# Patient Record
Sex: MALE | Race: WHITE | NOT HISPANIC OR LATINO | Employment: FULL TIME | URBAN - METROPOLITAN AREA
[De-identification: names, ages, dates, MRNs, and addresses within clinical notes are randomized per-mention and may not be internally consistent; named-entity substitution may affect disease eponyms.]

---

## 2017-04-15 ENCOUNTER — TELEPHONE (OUTPATIENT)
Dept: URGENT CARE | Facility: CLINIC | Age: 22
End: 2017-04-15

## 2017-04-15 ENCOUNTER — OFFICE VISIT (OUTPATIENT)
Dept: URGENT CARE | Facility: CLINIC | Age: 22
End: 2017-04-15
Payer: COMMERCIAL

## 2017-04-15 VITALS
TEMPERATURE: 98.2 F | OXYGEN SATURATION: 100 % | SYSTOLIC BLOOD PRESSURE: 138 MMHG | HEIGHT: 75 IN | WEIGHT: 203 LBS | RESPIRATION RATE: 18 BRPM | DIASTOLIC BLOOD PRESSURE: 84 MMHG | HEART RATE: 91 BPM | BODY MASS INDEX: 25.24 KG/M2

## 2017-04-15 DIAGNOSIS — S09.92XA NASAL TRAUMA, INITIAL ENCOUNTER: ICD-10-CM

## 2017-04-15 PROCEDURE — 99214 OFFICE O/P EST MOD 30 MIN: CPT | Performed by: NURSE PRACTITIONER

## 2017-04-15 RX ORDER — NAPROXEN 500 MG/1
500 TABLET ORAL 2 TIMES DAILY WITH MEALS
Qty: 10 TAB | Refills: 0 | Status: SHIPPED | OUTPATIENT
Start: 2017-04-15 | End: 2017-04-20

## 2017-04-15 ASSESSMENT — ENCOUNTER SYMPTOMS
HEADACHES: 0
DOUBLE VISION: 0
WEAKNESS: 0
DIAPHORESIS: 0
BLURRED VISION: 0
CHILLS: 0
DIZZINESS: 0
PHOTOPHOBIA: 0
FEVER: 0
EYE PAIN: 0

## 2017-04-15 NOTE — TELEPHONE ENCOUNTER
PC from patient.  He has tried OTC NSAIDs today since office visit with minimal pain relief.  Will prescribe Naprosyn prn pain.  May also take OTC tylenol in conjunction with this medication.  Continue ice compress and head elevation prn comfort measures. Follow up as discussed at visit today.

## 2017-04-15 NOTE — MR AVS SNAPSHOT
"        Nabeel Montanad   4/15/2017 10:00 AM   Office Visit   MRN: [id number]    Department:  [facility]   Dept Phone:  [phone]    Description:  Male : 1995   Provider:  CJ Singh           Reason for Visit     Facial Injury was hit by soft ball on nose yesterday      Allergies as of 4/15/2017     Allergen Noted Reactions    Amoxicillin 2015   Hives, Rash    Per patient      You were diagnosed with     Nasal trauma, initial encounter   [[id number]]         Vital Signs     Blood Pressure Pulse Temperature Respirations Height Weight    138/84 mmHg 91 36.8 °C (98.2 °F) 18 1.905 m (6' 3\") 92.08 kg (203 lb)    Body Mass Index Oxygen Saturation Smoking Status             25.37 kg/m2 100% Never Smoker          Basic Information     Date Of Birth Sex Race Ethnicity Preferred Language    1995 Male White Non- English      Problem List              ICD-10-CM Priority Class Noted - Resolved    IBS (irritable bowel syndrome) K58.9 Medium  2014 - Present    Anxiety F41.9 Medium  2014 - Present    Skin lesion of chest wall L98.9 Medium Present on Arrival 2014 - Present    Leukopenia D72.819   2015 - Present    Painful skin lesion L98.9   2015 - Present    HIV (human immunodeficiency virus infection) (CMS-[facility]) Z21   1/3/2016 - Present      Health Maintenance        Date Due Completion Dates    IMM PNEUMOCOCCAL 19-64 (ADULT) HIGHEST RISK SERIES (1 of 3 - PCV13) 3/27/2014 ---    IMM DTaP/Tdap/Td Vaccine (6 - Td) 2018, 1996, 1995, 1995, 1995            Current Immunizations     Dtap Vaccine 1996, 1995, 1995, 1995    HPV 9-VALENT VACCINE (GARDASIL 9) 12/10/2015    HPV Quadrivalent Vaccine (GARDASIL) 2015    Hepatitis A Vaccine, Ped/Adol 1998, 1998, 1998    Hepatitis B Vaccine Non-Recombivax (Ped/Adol) 1995, 1995, 1995    Hib Vaccine (Prp-d) Historical Data 1996, 1995, " 1995    IPV 4/24/1996, 1995, 1995, 1995    Meningococcal Conjugate Vaccine MCV4 (Menactra) 8/17/2015    OPV - Historical Data 11/15/2001    Tdap Vaccine 8/12/2008    Tetanus Vaccine 2/25/2003    Varicella Vaccine Live 8/12/2008, 6/2/2004      Below and/or attached are the medications your provider expects you to take. Review all of your home medications and newly ordered medications with your provider and/or pharmacist. Follow medication instructions as directed by your provider and/or pharmacist. Please keep your medication list with you and share with your provider. Update the information when medications are discontinued, doses are changed, or new medications (including over-the-counter products) are added; and carry medication information at all times in the event of emergency situations     Allergies:  AMOXICILLIN - Hives,Rash               Medications  Valid as of: April 15, 2017 - 10:55 AM    Generic Name Brand Name Tablet Size Instructions for use    Abacavir-Dolutegravir-Lamivud (Tab) Abacavir-Dolutegravir-Lamivud 600- MG Take  by mouth every day.        Abacavir-Dolutegravir-Lamivud   Take  by mouth.        Azithromycin (Tab) ZITHROMAX 250 MG Use as package directs.        Azithromycin (Tab) ZITHROMAX 250 MG Take as directed on package. 1 pack        Guaifenesin-Codeine (Solution) ROBITUSSIN -10 mg/5mL Take 5 mL by mouth at bedtime as needed for Cough.        Levonorg-Eth Estrad Triphasic (Tab) ENPRESSE  Take 1 Tab by mouth every day.        Oseltamivir Phosphate (Cap) TAMIFLU 75 MG Take 1 Cap by mouth 2 times a day.        .                 Medicines prescribed today were sent to:     [id number] [place] [facility] [place] - [address]    [address] [place] [address]    Phone: [phone] Fax: [phone]    Open 24 Hours?: No      Medication refill instructions:       If your prescription bottle indicates you have medication refills left, it is not necessary  to call your provider’s office. Please contact your pharmacy and they will refill your medication.    If your prescription bottle indicates you do not have any refills left, you may request refills at any time through one of the following ways: The online [facility] system (except Urgent Care), by calling your provider’s office, or by asking your pharmacy to contact your provider’s office with a refill request. Medication refills are processed only during regular business hours and may not be available until the next business day. Your provider may request additional information or to have a follow-up visit with you prior to refilling your medication.   *Please Note: Medication refills are assigned a new Rx number when refilled electronically. Your pharmacy may indicate that no refills were authorized even though a new prescription for the same medication is available at the pharmacy. Please request the medicine by name with the pharmacy before contacting your provider for a refill.           [facility] Access Code: Activation code not generated  Current [facility] Status: Active

## 2017-04-15 NOTE — PROGRESS NOTES
"Subjective:      Nabeel Ladd is a 22 y.o. male who presents with Facial Injury            Facial Injury  Pertinent negatives include no chills, congestion, diaphoresis, fever, headaches or weakness.   Patient comes in today after being struck in the nose yesterday with a softball.  He was attempting to catch a fly ball and missed, with the ball falling directly in his nose.  He denies any epistaxis at time of injury.  He denies any LOC.  He reports pain and swelling over the bridge of the nose, primarily on the left side, with no pain of other areas of the face.  No eye pain or vision changes.  No nasal drainage.  He is not taking any medications or using home measures to treat the symptoms.  He has a history of 3 prior \"broken noses\", one of which was confirmed on x-ray.  He has never required further medical care or surgical intervention for the past injuries.      Review of Systems   Constitutional: Negative for fever, chills, malaise/fatigue and diaphoresis.   HENT: Negative for congestion, ear pain and nosebleeds.    Eyes: Negative for blurred vision, double vision, photophobia and pain.   Neurological: Negative for dizziness, weakness and headaches.     Medications, Allergies, and current problem list reviewed today in Epic     Objective:     /84 mmHg  Pulse 91  Temp(Src) 36.8 °C (98.2 °F)  Resp 18  Ht 1.905 m (6' 3\")  Wt 92.08 kg (203 lb)  BMI 25.37 kg/m2  SpO2 100%     Physical Exam   Constitutional: He is oriented to person, place, and time.   HENT:   Head: Normocephalic.   Nose: Sinus tenderness present. No mucosal edema, rhinorrhea, nasal deformity, septal deviation or nasal septal hematoma. No epistaxis.  No foreign bodies. Right sinus exhibits no maxillary sinus tenderness and no frontal sinus tenderness. Left sinus exhibits no maxillary sinus tenderness and no frontal sinus tenderness.       Mouth/Throat: Oropharynx is clear and moist. No oropharyngeal exudate.   Nasal bridge is slightly " swollen and tender to palpation, particularly on the left.  No bruising.  No other facial pain on palpation.   Eyes: Conjunctivae and EOM are normal. Pupils are equal, round, and reactive to light. Right eye exhibits no discharge. Left eye exhibits no discharge. No scleral icterus.   Neck: Neck supple. No JVD present. No tracheal deviation present. No thyromegaly present.   Cardiovascular: Normal rate, regular rhythm and normal heart sounds.    Pulmonary/Chest: Effort normal and breath sounds normal. No stridor.   Lymphadenopathy:     He has no cervical adenopathy.   Neurological: He is alert and oriented to person, place, and time.   Vitals reviewed.              Assessment/Plan:     1. Nasal trauma, initial encounter    Discussed exam findings with patient.  No indication for facial CT at this time.  Treat for nasal bridge trauma, presumptive of self-limited fracture.  OTC NSAIDs or tylenol prn pain.  Ice compress and elevation prn comfort measures.  Maintain adequate po hydration.  Follow up precautions reviewed.  Patient verbalized understanding of and agreed with plan of care.

## 2018-01-21 ENCOUNTER — HOSPITAL ENCOUNTER (EMERGENCY)
Facility: MEDICAL CENTER | Age: 23
End: 2018-01-21
Attending: EMERGENCY MEDICINE
Payer: COMMERCIAL

## 2018-01-21 VITALS
HEIGHT: 75 IN | RESPIRATION RATE: 18 BRPM | HEART RATE: 97 BPM | OXYGEN SATURATION: 97 % | BODY MASS INDEX: 26.26 KG/M2 | TEMPERATURE: 99.9 F | SYSTOLIC BLOOD PRESSURE: 153 MMHG | DIASTOLIC BLOOD PRESSURE: 91 MMHG | WEIGHT: 211.2 LBS

## 2018-01-21 DIAGNOSIS — N45.1 LEFT EPIDIDYMITIS: ICD-10-CM

## 2018-01-21 LAB
APPEARANCE UR: CLEAR
BACTERIA #/AREA URNS HPF: ABNORMAL /HPF
BILIRUB UR QL STRIP.AUTO: NEGATIVE
COLOR UR: YELLOW
EPI CELLS #/AREA URNS HPF: ABNORMAL /HPF
GLUCOSE UR STRIP.AUTO-MCNC: NEGATIVE MG/DL
KETONES UR STRIP.AUTO-MCNC: NEGATIVE MG/DL
LEUKOCYTE ESTERASE UR QL STRIP.AUTO: NEGATIVE
MICRO URNS: ABNORMAL
MUCOUS THREADS #/AREA URNS HPF: ABNORMAL /HPF
NITRITE UR QL STRIP.AUTO: NEGATIVE
PH UR STRIP.AUTO: 6 [PH]
PROT UR QL STRIP: NEGATIVE MG/DL
RBC # URNS HPF: ABNORMAL /HPF
RBC UR QL AUTO: ABNORMAL
SP GR UR STRIP.AUTO: <=1.005
WBC #/AREA URNS HPF: ABNORMAL /HPF

## 2018-01-21 PROCEDURE — 99284 EMERGENCY DEPT VISIT MOD MDM: CPT

## 2018-01-21 PROCEDURE — 96372 THER/PROPH/DIAG INJ SC/IM: CPT

## 2018-01-21 PROCEDURE — 87591 N.GONORRHOEAE DNA AMP PROB: CPT

## 2018-01-21 PROCEDURE — 81001 URINALYSIS AUTO W/SCOPE: CPT

## 2018-01-21 PROCEDURE — 700102 HCHG RX REV CODE 250 W/ 637 OVERRIDE(OP): Performed by: EMERGENCY MEDICINE

## 2018-01-21 PROCEDURE — 700111 HCHG RX REV CODE 636 W/ 250 OVERRIDE (IP): Performed by: EMERGENCY MEDICINE

## 2018-01-21 PROCEDURE — 87491 CHLMYD TRACH DNA AMP PROBE: CPT

## 2018-01-21 PROCEDURE — A9270 NON-COVERED ITEM OR SERVICE: HCPCS | Performed by: EMERGENCY MEDICINE

## 2018-01-21 RX ORDER — CIPROFLOXACIN 500 MG/1
500 TABLET, FILM COATED ORAL 2 TIMES DAILY
Qty: 14 TAB | Refills: 0 | Status: SHIPPED | OUTPATIENT
Start: 2018-01-21 | End: 2018-01-28

## 2018-01-21 RX ORDER — IBUPROFEN 600 MG/1
600 TABLET ORAL EVERY 6 HOURS PRN
Qty: 30 TAB | Refills: 0 | Status: SHIPPED | OUTPATIENT
Start: 2018-01-21 | End: 2018-01-21

## 2018-01-21 RX ORDER — CEFTRIAXONE 500 MG/1
250 INJECTION, POWDER, FOR SOLUTION INTRAMUSCULAR; INTRAVENOUS ONCE
Status: COMPLETED | OUTPATIENT
Start: 2018-01-21 | End: 2018-01-21

## 2018-01-21 RX ORDER — IBUPROFEN 600 MG/1
600 TABLET ORAL ONCE
Status: COMPLETED | OUTPATIENT
Start: 2018-01-21 | End: 2018-01-21

## 2018-01-21 RX ORDER — CIPROFLOXACIN 500 MG/1
500 TABLET, FILM COATED ORAL 2 TIMES DAILY
Qty: 14 TAB | Refills: 0 | Status: SHIPPED | OUTPATIENT
Start: 2018-01-21 | End: 2018-01-21

## 2018-01-21 RX ORDER — AZITHROMYCIN 250 MG/1
1000 TABLET, FILM COATED ORAL ONCE
Status: COMPLETED | OUTPATIENT
Start: 2018-01-21 | End: 2018-01-21

## 2018-01-21 RX ORDER — CIPROFLOXACIN 500 MG/1
500 TABLET, FILM COATED ORAL ONCE
Status: COMPLETED | OUTPATIENT
Start: 2018-01-21 | End: 2018-01-21

## 2018-01-21 RX ORDER — IBUPROFEN 600 MG/1
600 TABLET ORAL EVERY 6 HOURS PRN
Qty: 30 TAB | Refills: 0 | Status: SHIPPED | OUTPATIENT
Start: 2018-01-21 | End: 2018-08-21

## 2018-01-21 RX ADMIN — CIPROFLOXACIN 500 MG: 500 TABLET, FILM COATED ORAL at 19:47

## 2018-01-21 RX ADMIN — CEFTRIAXONE SODIUM 250 MG: 500 INJECTION, POWDER, FOR SOLUTION INTRAMUSCULAR; INTRAVENOUS at 19:45

## 2018-01-21 RX ADMIN — AZITHROMYCIN 1000 MG: 250 TABLET, FILM COATED ORAL at 19:46

## 2018-01-21 RX ADMIN — IBUPROFEN 600 MG: 600 TABLET, FILM COATED ORAL at 19:46

## 2018-01-21 ASSESSMENT — PAIN SCALES - GENERAL
PAINLEVEL_OUTOF10: 7
PAINLEVEL_OUTOF10: 8

## 2018-01-22 NOTE — PROGRESS NOTES
Patient has allergy to penicillin. Will hold patient for 15 minutes and monitor for reaction to Rocephin. MD notified

## 2018-01-22 NOTE — ED PROVIDER NOTES
"ED Provider Note    CHIEF COMPLAINT  Chief Complaint   Patient presents with   • Scrotal Pain     left scrotal pain started today     Seen at 7:11 PM    HPI  Nabeel Ladd is a 22 y.o. male who presents with gradual onset of left scrotal pain. The patient 1st noticed it when he woke from sleep early this morning. He notes the pain is intermittent and only present when he walks or has direct palpation to the left scrotum. He describes it as stabbing, nonradiating. He did have an episode of stabbing right flank pain, this was several days ago and resolved spontaneously. He notes that he had some blood in his ejaculate as well as a urine today.    He does have penetrative anal intercourse and has had a new partner in the last week. He denies any fevers, chills, chest pain, shortness of breath, nausea, vomiting, dysuria, rash. He denies any scrotal trauma.    REVIEW OF SYSTEMS  See HPI  PAST MEDICAL HISTORY   has a past medical history of Anxiety; IBS (irritable bowel syndrome); and IBS (irritable bowel syndrome).    SOCIAL HISTORY  Social History     Social History Main Topics   • Smoking status: Never Smoker   • Smokeless tobacco: Never Used   • Alcohol use 0.0 oz/week   • Drug use:      Types: Marijuana      Comment: ocassional marijuana/no cigarettes   • Sexual activity: Yes     Partners: Male      Comment: uses condoms       SURGICAL HISTORY   has a past surgical history that includes other.    CURRENT MEDICATIONS  Reviewed.  See Encounter Summary.     ALLERGIES  Allergies   Allergen Reactions   • Amoxicillin Hives and Rash     Per patient       PHYSICAL EXAM  VITAL SIGNS: /91   Pulse 97   Temp 37.7 °C (99.9 °F)   Resp 18   Ht 1.905 m (6' 3\")   Wt 95.8 kg (211 lb 3.2 oz)   SpO2 97%   BMI 26.40 kg/m²   Constitutional: Awake, alert in no apparent distress.  HENT: Normocephalic, Bilateral external ears normal. Nose normal.   Eyes: Conjunctiva normal, non-icteric, EOMI.    Thorax & Lungs: Easy unlabored " respirations, clear to auscultation bilaterally  Cardiovascular:  Tachycardic, regular  Abdomen:  No distention, soft, nontender, normal active bowel sounds  : Circumcised, normal phallus, no meatal discharge. No inguinal masses or tenderness. Right testicle is normal. Left testicle has some mild edema in the overlying skin, there is no warmth or redness. The testicle was nontender within normal lives. The epididymis on the posterior aspect of the testicle is significantly tender.    Skin: Visualized skin is  Dry, No erythema, No rash.   Extremities:   atraumatic   Neurologic: Alert, Grossly non-focal.   Psychiatric: Affect and Mood normal    RADIOLOGY  No orders to display     The radiologist's interpretation of all radiological studies have been reviewed by me.  Nursing notes and vital signs were reviewed. (See chart for details)    Decision Making:  This is a 22 y.o. year old male who presents with left scrotal pain and bloody ejaculate. Examination and history are classic for epididymitis. The patient is at risk for E. coli epididymitis as well as gonorrhea and chlamydia. He will be treated for all 3. GC urine has been sent. Urinalysis shows rare bacteria.    His presentation is not concerning for testicular torsion.     I explained the pathophysiology of epididymitis the patient. I explained that his symptoms should resolve with antibiotics, scrotal support and anti-inflammatories. He should return for any intractable abdominal pain, intractable testicular pain or any other concern. Of note, the patient is tachycardic of unclear etiology. He denies any chest pain, shortness of breath, nausea, vomiting or fevers. He is moderately hypertensive as well. I recommend follow-up for this. I do not suspect sepsis at this time.    The patient's blood pressure is elevated today. >120/80. I have referred them to primary care for follow up.       DISPOSITION:  Patient will be discharged home in good  condition.    Discharge Medications:  Discharge Medication List as of 1/21/2018  7:57 PM      START taking these medications    Details   ciprofloxacin (CIPRO) 500 MG Tab Take 1 Tab by mouth 2 times a day for 7 days., Disp-14 Tab, R-0, Normal      ibuprofen (MOTRIN) 600 MG Tab Take 1 Tab by mouth every 6 hours as needed., Disp-30 Tab, R-0, Normal             The patient was discharged home (see d/c instructions) and told to return immediately for any signs or symptoms listed, or any worsening at all.  The patient verbally agreed to the discharge precautions and follow-up plan which is documented in EPIC.    FINAL IMPRESSION  1. Left epididymitis

## 2018-01-23 LAB
C TRACH DNA SPEC QL NAA+PROBE: NEGATIVE
N GONORRHOEA DNA SPEC QL NAA+PROBE: NEGATIVE
SPECIMEN SOURCE: NORMAL

## 2018-08-21 ENCOUNTER — OFFICE VISIT (OUTPATIENT)
Dept: URGENT CARE | Facility: CLINIC | Age: 23
End: 2018-08-21
Payer: COMMERCIAL

## 2018-08-21 VITALS
OXYGEN SATURATION: 98 % | TEMPERATURE: 98.1 F | SYSTOLIC BLOOD PRESSURE: 110 MMHG | HEART RATE: 90 BPM | WEIGHT: 211 LBS | RESPIRATION RATE: 16 BRPM | HEIGHT: 75 IN | DIASTOLIC BLOOD PRESSURE: 74 MMHG | BODY MASS INDEX: 26.24 KG/M2

## 2018-08-21 DIAGNOSIS — T14.8XXA WOUND OF SKIN: ICD-10-CM

## 2018-08-21 PROCEDURE — 90471 IMMUNIZATION ADMIN: CPT | Performed by: FAMILY MEDICINE

## 2018-08-21 PROCEDURE — 99214 OFFICE O/P EST MOD 30 MIN: CPT | Mod: 25 | Performed by: FAMILY MEDICINE

## 2018-08-21 PROCEDURE — 90715 TDAP VACCINE 7 YRS/> IM: CPT | Performed by: FAMILY MEDICINE

## 2018-08-21 RX ORDER — CIPROFLOXACIN 500 MG/1
500 TABLET, FILM COATED ORAL 2 TIMES DAILY
Qty: 6 TAB | Refills: 0 | Status: SHIPPED | OUTPATIENT
Start: 2018-08-21 | End: 2018-08-24

## 2018-08-21 ASSESSMENT — ENCOUNTER SYMPTOMS: FEVER: 0

## 2018-08-21 NOTE — PROGRESS NOTES
"Subjective:      Nabeel Ladd is a 23 y.o. male who presents with Puncture Wound (in right heel yesterday pulled metal out, concerned about infection and td)    Chief Complaint   Patient presents with   • Puncture Wound     in right heel yesterday pulled metal out, concerned about infection and td        - This is a very pleasant 23 y.o. male with complaints of stepped on metal object yesterday. Unsure of last td booster. Mild pain to PW site            ALLERGIES:  Amoxicillin     PMH:  Past Medical History:   Diagnosis Date   • Anxiety    • IBS (irritable bowel syndrome)    • IBS (irritable bowel syndrome)         MEDS:    Current Outpatient Prescriptions:   •  ciprofloxacin (CIPRO) 500 MG Tab, Take 1 Tab by mouth 2 times a day for 3 days., Disp: 6 Tab, Rfl: 0  •  Abacavir-Dolutegravir-Lamivud (TRIUMEQ PO), Take  by mouth., Disp: , Rfl:   •  levonorgestrel-ethinyl estradiol (ENPRESSE) per tablet, Take 1 Tab by mouth every day., Disp: , Rfl:     ** I have documented what I find to be significant in regards to past medical, social, family and surgical history  in my HPI or under PMH/PSH/FH review section, otherwise it is contributory **         HPI    Review of Systems   Constitutional: Negative for fever.   All other systems reviewed and are negative.         Objective:     /74   Pulse 90   Temp 36.7 °C (98.1 °F)   Resp 16   Ht 1.905 m (6' 3\")   Wt 95.7 kg (211 lb)   SpO2 98%   BMI 26.37 kg/m²      Physical Exam   Constitutional: He appears well-developed. No distress.   HENT:   Head: Normocephalic and atraumatic.   Pulmonary/Chest: Effort normal. No respiratory distress.   Neurological: He is alert. He exhibits normal muscle tone.   Skin: Skin is warm and dry.   Psychiatric: He has a normal mood and affect. Judgment normal.   Nursing note and vitals reviewed.  PW plantar aspect Rt heel w/o complication             Assessment/Plan:           1. Wound of skin  TD =>6yo IM    ciprofloxacin (CIPRO) 500 MG " Tab             Dx & d/c instructions discussed w/ patient and/or family members. Follow up w/ Prvt Dr or here in 3-4 days if not getting better, sooner if needed,  ER if worse and UC/PCP unavailable.        Possible side effects (i.e. Rash, GI upset/constipation, sedation, elevation of BP or sugars) of any medications given discussed.

## 2019-05-01 ENCOUNTER — OFFICE VISIT (OUTPATIENT)
Dept: URGENT CARE | Facility: MEDICAL CENTER | Age: 24
End: 2019-05-01
Payer: COMMERCIAL

## 2019-05-01 VITALS
BODY MASS INDEX: 26.24 KG/M2 | HEART RATE: 82 BPM | HEIGHT: 75 IN | OXYGEN SATURATION: 97 % | TEMPERATURE: 98.5 F | DIASTOLIC BLOOD PRESSURE: 72 MMHG | SYSTOLIC BLOOD PRESSURE: 118 MMHG | WEIGHT: 211 LBS | RESPIRATION RATE: 16 BRPM

## 2019-05-01 DIAGNOSIS — B20 HIV (HUMAN IMMUNODEFICIENCY VIRUS INFECTION) (HCC): ICD-10-CM

## 2019-05-01 DIAGNOSIS — B34.9 ACUTE VIRAL SYNDROME: ICD-10-CM

## 2019-05-01 LAB
FLUAV+FLUBV AG SPEC QL IA: NEGATIVE
INT CON NEG: NEGATIVE
INT CON NEG: NEGATIVE
INT CON POS: POSITIVE
INT CON POS: POSITIVE
S PYO AG THROAT QL: NEGATIVE

## 2019-05-01 PROCEDURE — 99214 OFFICE O/P EST MOD 30 MIN: CPT | Performed by: PHYSICIAN ASSISTANT

## 2019-05-01 PROCEDURE — 87804 INFLUENZA ASSAY W/OPTIC: CPT | Performed by: PHYSICIAN ASSISTANT

## 2019-05-01 PROCEDURE — 87880 STREP A ASSAY W/OPTIC: CPT | Performed by: PHYSICIAN ASSISTANT

## 2019-05-01 RX ORDER — BENZONATATE 100 MG/1
200 CAPSULE ORAL 3 TIMES DAILY PRN
Qty: 60 CAP | Refills: 0 | Status: SHIPPED | OUTPATIENT
Start: 2019-05-01 | End: 2019-07-03

## 2019-05-01 ASSESSMENT — ENCOUNTER SYMPTOMS
SORE THROAT: 1
SHORTNESS OF BREATH: 0
HEMOPTYSIS: 0
FEVER: 0
CHILLS: 0
COUGH: 1
HEADACHES: 1
WHEEZING: 0
SPUTUM PRODUCTION: 1

## 2019-05-01 NOTE — PROGRESS NOTES
"Subjective:   Nabeel Ladd is a 24 y.o. male who presents for Influenza (started with sore throat, turned into chills, vomiting, hacking mucus, chest tightness, cough)    This is a new problem.  Patient presents to urgent care with concern for possible influenza.  He reports onset of sore throat 2 days ago with nasal congestion and cough beginning yesterday.  He reports generalized body aches and fatigue as well.  Cough is productive of colored sputum that is thick at times.  Patient also reports pain in the upper anterior chest with deep breathing and coughing.  He denies any shortness of breath or hemoptysis..  Patient has had no known illness exposure.  Patient denies fever.    Of note, the patient is HIV positive being treated with antiviral and his most recent viral load was undetectable.        Influenza   Associated symptoms include congestion, coughing, headaches and a sore throat. Pertinent negatives include no chills, fever or rash.     Review of Systems   Constitutional: Positive for malaise/fatigue. Negative for chills and fever.   HENT: Positive for congestion and sore throat. Negative for ear pain.    Respiratory: Positive for cough and sputum production. Negative for hemoptysis, shortness of breath and wheezing.    Cardiovascular: Negative for leg swelling.        Anterior chest wall pain   Skin: Negative for rash.   Neurological: Positive for headaches.   All other systems reviewed and are negative.    Allergies   Allergen Reactions   • Amoxicillin Hives and Rash     Per patient        Objective:   /72   Pulse 82   Temp 36.9 °C (98.5 °F)   Resp 16   Ht 1.905 m (6' 3\")   Wt 95.7 kg (211 lb)   SpO2 97%   BMI 26.37 kg/m²   Physical Exam   Constitutional: He is oriented to person, place, and time. He appears well-developed and well-nourished. He does not appear ill. No distress.   HENT:   Head: Normocephalic and atraumatic.   Right Ear: Tympanic membrane, external ear and ear canal normal. "   Left Ear: Tympanic membrane, external ear and ear canal normal.   Nose: Mucosal edema and rhinorrhea present. Right sinus exhibits no maxillary sinus tenderness and no frontal sinus tenderness. Left sinus exhibits no maxillary sinus tenderness and no frontal sinus tenderness.   Mouth/Throat: Uvula is midline and mucous membranes are normal. Posterior oropharyngeal erythema present. No oropharyngeal exudate. Tonsils are 1+ on the right. Tonsils are 1+ on the left. No tonsillar exudate.   Eyes: Pupils are equal, round, and reactive to light. Conjunctivae and EOM are normal.   Neck: Normal range of motion. Neck supple.   Cardiovascular: Normal rate, regular rhythm and normal heart sounds.  Exam reveals no friction rub.    No murmur heard.  Pulmonary/Chest: Effort normal and breath sounds normal. No respiratory distress.   Abdominal: Soft. Bowel sounds are normal. There is no hepatosplenomegaly. There is no tenderness.   Musculoskeletal: Normal range of motion.   Lymphadenopathy:        Head (right side): No submental, no submandibular and no tonsillar adenopathy present.        Head (left side): No submental, no submandibular and no tonsillar adenopathy present.     He has no cervical adenopathy.        Right: No supraclavicular adenopathy present.        Left: No supraclavicular adenopathy present.   Neurological: He is alert and oriented to person, place, and time. He has normal strength. No cranial nerve deficit or sensory deficit. Coordination normal.   Skin: Skin is warm and dry. No rash noted.   Psychiatric: He has a normal mood and affect. Judgment normal.   Vitals reviewed.          Assessment/Plan:   Assessment    1. Acute viral syndrome  - POCT Influenza A/B  - POCT Rapid Strep A  - maalox plus-benadryl-visc lidocaine (MAGIC MOUTHWASH); 10 ml swish, gargle and spit every 6 hours as needed for sore throat  Dispense: 100 mL; Refill: 0  - benzonatate (TESSALON) 100 MG Cap; Take 2 Caps by mouth 3 times a day as  needed.  Dispense: 60 Cap; Refill: 0    2. HIV (human immunodeficiency virus infection) ([facility])    Rapid strep and influenza testing was negative.  Symptomatic, supportive care.  Increase fluids, rest.  Patient is given a prescription for Magic mouthwash and Tessalon Perles as needed for cough.Increase fluids, rest.  Patient may use salt water gargles, ice pops, cool fluids.      Given the patient's HIV status we will need to watch him very carefully for worsening.  Red flag warning symptoms with strict ER/follow-up precautions are given.    Differential diagnosis, natural history, supportive care, and indications for immediate follow-up discussed.    If not improving in 3-5 days, F/U with PCP or return to  or sooner if worsens    Please note that this note was created using voice recognition speech to text software. Every effort has been made to correct obvious errors.  However, I expect there are errors of grammar and possibly context that were not discovered prior to finalizing the note    JOYCE Garcia PA-C

## 2019-05-01 NOTE — LETTER
May 1, 2019         Patient: Nabeel Ladd   YOB: 1995   Date of Visit: 5/1/2019           To Whom it May Concern:    Nabeel Ladd was seen in my clinic on 5/1/2019. He may return to work on 5/3/19..    If you have any questions or concerns, please don't hesitate to call.        Sincerely,           Alicia Garcia P.A.-C.  Electronically Signed

## 2019-05-01 NOTE — PATIENT INSTRUCTIONS
"Viral Syndrome  You or your child has Viral Syndrome. It is the most common infection causing \"colds\" and infections in the nose, throat, sinuses, and breathing tubes. Sometimes the infection causes nausea, vomiting, or diarrhea. The germ that causes the infection is a virus. No antibiotic or other medicine will kill it. There are medicines that you can take to make you or your child more comfortable.   HOME CARE INSTRUCTIONS   · Rest in bed until you start to feel better.   · If you have diarrhea or vomiting, eat small amounts of crackers and toast. Soup is helpful.   · Do not give aspirin or medicine that contains aspirin to children.   · Only take over-the-counter or prescription medicines for pain, discomfort, or fever as directed by your caregiver.   SEEK IMMEDIATE MEDICAL CARE IF:   · You or your child has not improved within one week.   · You or your child has pain that is not at least partially relieved by over-the-counter medicine.   · Thick, colored mucus or blood is coughed up.   · Discharge from the nose becomes thick yellow or green.   · Diarrhea or vomiting gets worse.   · There is any major change in your or your child's condition.   · You or your child develops a skin rash, stiff neck, severe headache, or are unable to hold down food or fluid.   · You or your child has an oral temperature above 102° F (38.9° C), not controlled by medicine.   · Your baby is older than 3 months with a rectal temperature of 102° F (38.9° C) or higher.   · Your baby is 3 months old or younger with a rectal temperature of 100.4° F (38° C) or higher.   Document Released: 12/03/2007 Document Revised: 03/11/2013 Document Reviewed: 12/03/2008  [employer]® Patient Information ©2013 [employer].  "

## 2019-05-02 ENCOUNTER — OFFICE VISIT (OUTPATIENT)
Dept: URGENT CARE | Facility: MEDICAL CENTER | Age: 24
End: 2019-05-02
Payer: COMMERCIAL

## 2019-05-02 VITALS
OXYGEN SATURATION: 97 % | HEART RATE: 110 BPM | HEIGHT: 75 IN | BODY MASS INDEX: 26.04 KG/M2 | WEIGHT: 209.4 LBS | SYSTOLIC BLOOD PRESSURE: 118 MMHG | DIASTOLIC BLOOD PRESSURE: 76 MMHG | TEMPERATURE: 99.3 F

## 2019-05-02 DIAGNOSIS — J02.9 VIRAL PHARYNGITIS: ICD-10-CM

## 2019-05-02 DIAGNOSIS — J02.9 SORE THROAT: ICD-10-CM

## 2019-05-02 DIAGNOSIS — B20 HIV (HUMAN IMMUNODEFICIENCY VIRUS INFECTION) (HCC): ICD-10-CM

## 2019-05-02 LAB
INT CON NEG: NORMAL
INT CON POS: NORMAL
S PYO AG THROAT QL: NEGATIVE

## 2019-05-02 PROCEDURE — 87880 STREP A ASSAY W/OPTIC: CPT | Performed by: NURSE PRACTITIONER

## 2019-05-02 PROCEDURE — 99214 OFFICE O/P EST MOD 30 MIN: CPT | Performed by: NURSE PRACTITIONER

## 2019-05-02 RX ORDER — CLINDAMYCIN HYDROCHLORIDE 300 MG/1
300 CAPSULE ORAL 3 TIMES DAILY
Qty: 30 CAP | Refills: 0 | Status: SHIPPED | OUTPATIENT
Start: 2019-05-02 | End: 2019-05-12

## 2019-05-02 ASSESSMENT — ENCOUNTER SYMPTOMS
SORE THROAT: 1
SWOLLEN GLANDS: 1
COUGH: 1

## 2019-05-02 NOTE — PROGRESS NOTES
"Subjective:      Nabeel Ladd is a 24 y.o. male who presents with Pharyngitis (x2 days; swollen lymph nodes; hurts to swallow; fever; body aches )            Pharyngitis    This is a new problem. Episode onset: pt reports he was seen yesterday in the UC and prompted to return if his symptoms started to get worse. He states his ST is worse today than yesterday and his lymph nodes feel more swollen. He is HIV positive but his last viral load was undetectable. The problem has been gradually worsening. Neither side of throat is experiencing more pain than the other. There has been no fever. Associated symptoms include congestion, coughing and swollen glands. He has tried NSAIDs and gargles (he states he is not using the magic mouth rinse as it was too expensive) for the symptoms. The treatment provided no relief.       Review of Systems   HENT: Positive for congestion and sore throat.    Respiratory: Positive for cough.    All other systems reviewed and are negative.    Past Medical History:   Diagnosis Date   • Anxiety    • IBS (irritable bowel syndrome)    • IBS (irritable bowel syndrome)       Past Surgical History:   Procedure Laterality Date   • OTHER      wisdom teeth 8/13      Social History     Social History   • Marital status: Single     Spouse name: N/A   • Number of children: N/A   • Years of education: N/A     Occupational History   • Not on file.     Social History Main Topics   • Smoking status: Never Smoker   • Smokeless tobacco: Never Used   • Alcohol use 0.0 oz/week   • Drug use: Yes     Types: Marijuana      Comment: ocassional marijuana/no cigarettes   • Sexual activity: Yes     Partners: Male      Comment: uses condoms     Other Topics Concern   • Not on file     Social History Narrative   • No narrative on file          Objective:     /76   Pulse (!) 110   Temp 37.4 °C (99.3 °F) (Temporal)   Ht 1.905 m (6' 3\")   Wt 95 kg (209 lb 6.4 oz)   SpO2 97%   BMI 26.17 kg/m²      Physical Exam "   Constitutional: He is oriented to person, place, and time. Vital signs are normal. He appears well-developed and well-nourished.   HENT:   Head: Normocephalic and atraumatic.   Right Ear: Tympanic membrane and external ear normal.   Left Ear: Tympanic membrane and external ear normal.   Nose: Rhinorrhea present.   Mouth/Throat: Posterior oropharyngeal erythema present. No oropharyngeal exudate.   Eyes: Pupils are equal, round, and reactive to light. EOM are normal.   Neck: Normal range of motion.   Cardiovascular: Normal rate and regular rhythm.    Pulmonary/Chest: Effort normal and breath sounds normal.   Musculoskeletal: Normal range of motion.   Lymphadenopathy:        Head (right side): Submandibular adenopathy present.        Head (left side): Submandibular adenopathy present.   Neurological: He is alert and oriented to person, place, and time.   Skin: Skin is warm and dry. Capillary refill takes less than 2 seconds.   Psychiatric: He has a normal mood and affect. His speech is normal and behavior is normal. Thought content normal.   Vitals reviewed.              Assessment/Plan:     1. Sore throat  - POCT Rapid Strep A NEGATIVE    2. Viral pharyngitis  - clindamycin (CLEOCIN) 300 MG Cap; Take 1 Cap by mouth 3 times a day for 10 days.  Dispense: 30 Cap; Refill: 0    3. HIV (human immunodeficiency virus infection) ([facility])    Discussed with patient viral etiology of pharyngitis at this time   Vitals are stable, non toxic appearing  Contingent antibiotic prescription given to patient to fill upon meeting criteria of guidelines discussed.   Warm salt water gargles  Alternate tylenol and ibuprofen for pain  Soft foods and cool liquids  Throat lozenges as directed  Work note provided  Supportive care, differential diagnoses, and indications for immediate follow-up discussed with patient.    Pathogenesis of diagnosis discussed including typical length and natural progression.      Instructed to return to  or [facility]  emergency department if symptoms fail to improve, for any change in condition, further concerns, or new concerning symptoms.  Patient states understanding of the plan of care and discharge instructions.

## 2019-05-02 NOTE — LETTER
May 2, 2019         Patient: Nabeel Ladd   YOB: 1995   Date of Visit: 5/2/2019           To Whom it May Concern:    Nabeel Ladd was seen in my clinic on 5/2/2019. Please excuse him from work 5/2/19-5/3/19.        Sincerely,           KEIRA Weathers.  Electronically Signed

## 2019-07-02 ENCOUNTER — TELEPHONE (OUTPATIENT)
Dept: MEDICAL GROUP | Facility: LAB | Age: 24
End: 2019-07-02

## 2019-07-02 NOTE — TELEPHONE ENCOUNTER
NEW PATIENT VISIT PRE-VISIT PLANNING    1.  [employer] Patient is checked in Patient Demographics? YES    2.  Immunizations were updated in [facility] using WebIZ?: Yes       •  Web Iz Recommendations: HPV and PREVNAR (PCV13)     3.  Is this appointment scheduled as a Hospital Follow-Up? No    4.  Patient is due for the following Health Maintenance Topics:   Health Maintenance Due   Topic Date Due   • IMM PNEUMOCOCCAL 19-64 (ADULT) HIGHEST RISK SERIES (1 of 3 - PCV13) 03/27/2014   • IMM MENINGOCOCCAL VACCINE (MCV4) (2 - Risk 2-dose series) 10/12/2015         5. Orders for overdue Health Maintenance topics pended in Pre-Charting? N\A    6.  Reviewed/Updated the following with patient:   •   Preferred Pharmacy? NO       •   Preferred Lab? NO       •   Preferred Communication? NO       •   Allergies? NO       •   Medications? NO       •   Social History? NO       •   Family History (document living status of immediate family members and if + hx of cancer, diabetes, hypertension, hyperlipidemia, heart attack, stroke) NO    7.  Updated Care Team?       •   DME Company (gait device, O2, CPAP, etc.) NO       •   Other Specialists (eye doctor, derm, GYN, cardiology, endo, etc): NO    8.  Patient was informed to arrive 15 min prior to their   scheduled appointment and bring in their medication bottles.

## 2019-07-03 ENCOUNTER — OFFICE VISIT (OUTPATIENT)
Dept: MEDICAL GROUP | Facility: LAB | Age: 24
End: 2019-07-03
Payer: COMMERCIAL

## 2019-07-03 ENCOUNTER — HOSPITAL ENCOUNTER (OUTPATIENT)
Dept: RADIOLOGY | Facility: MEDICAL CENTER | Age: 24
End: 2019-07-03
Attending: FAMILY MEDICINE
Payer: COMMERCIAL

## 2019-07-03 VITALS
OXYGEN SATURATION: 96 % | TEMPERATURE: 98.6 F | HEIGHT: 75 IN | WEIGHT: 212.6 LBS | DIASTOLIC BLOOD PRESSURE: 72 MMHG | BODY MASS INDEX: 26.43 KG/M2 | SYSTOLIC BLOOD PRESSURE: 120 MMHG | HEART RATE: 86 BPM

## 2019-07-03 DIAGNOSIS — R05.3 PERSISTENT COUGH FOR 3 WEEKS OR LONGER: ICD-10-CM

## 2019-07-03 DIAGNOSIS — Z13.29 SCREENING FOR ENDOCRINE DISORDER: ICD-10-CM

## 2019-07-03 DIAGNOSIS — Z13.21 ENCOUNTER FOR VITAMIN DEFICIENCY SCREENING: ICD-10-CM

## 2019-07-03 DIAGNOSIS — Z13.29 SCREENING FOR THYROID DISORDER: ICD-10-CM

## 2019-07-03 DIAGNOSIS — Z13.0 SCREENING FOR DEFICIENCY ANEMIA: ICD-10-CM

## 2019-07-03 DIAGNOSIS — J30.1 NON-SEASONAL ALLERGIC RHINITIS DUE TO POLLEN: ICD-10-CM

## 2019-07-03 DIAGNOSIS — Z13.220 SCREENING FOR HYPERLIPIDEMIA: ICD-10-CM

## 2019-07-03 DIAGNOSIS — Z13.1 SCREENING FOR DIABETES MELLITUS: ICD-10-CM

## 2019-07-03 DIAGNOSIS — B20 HIV (HUMAN IMMUNODEFICIENCY VIRUS INFECTION) (HCC): ICD-10-CM

## 2019-07-03 PROBLEM — R05.9 COUGH: Status: ACTIVE | Noted: 2019-07-03

## 2019-07-03 PROCEDURE — 99204 OFFICE O/P NEW MOD 45 MIN: CPT | Performed by: FAMILY MEDICINE

## 2019-07-03 PROCEDURE — 71046 X-RAY EXAM CHEST 2 VIEWS: CPT

## 2019-07-03 ASSESSMENT — PATIENT HEALTH QUESTIONNAIRE - PHQ9: CLINICAL INTERPRETATION OF PHQ2 SCORE: 0

## 2019-07-03 NOTE — ASSESSMENT & PLAN NOTE
Patient had a URI early May and those symptoms resolved but he still has a dry cough.  He has to use an inhaler.  When he lived in [place] he had an inhaler around age 5 but does not believe he was diagnosed with asthma.  He no longer has a sore throat or fever.  No weight loss.

## 2019-07-03 NOTE — ASSESSMENT & PLAN NOTE
Diagnosed in 2015 - viral counts are non detectable on his current Triumeq therapy.  He is being followed at [facility] for this.

## 2019-07-08 RX ORDER — AZITHROMYCIN 250 MG/1
TABLET, FILM COATED ORAL
Qty: 6 TAB | Refills: 0 | Status: SHIPPED | OUTPATIENT
Start: 2019-07-08 | End: 2019-07-13

## 2019-07-08 NOTE — ASSESSMENT & PLAN NOTE
Patient complains of  nasal congestion, rhinorrhea, sneezing itchy, red, irritated eyes  Has tried over the counter medications   History of asthma as a child-  No increased symptoms with exertion.  Symptoms perennial with seasonal exacerbation in spring, fall.     ROS  No fever, chills, sweats.  No productive cough  No sinus pain or pressure  No swollen glands  No rash. No eczema.  Pertinent  ROS findings as above. All other systems reviewed and are negative.       .

## 2019-07-08 NOTE — PROGRESS NOTES
Subjective:     Chief Complaint   Patient presents with   • Establish Care   • Breathing Problem       Nabeel Ladd is a 24 y.o. male here today for evaluation and management of:    HIV (human immunodeficiency virus infection) ([facility])  Diagnosed in 2015 - viral counts are non detectable on his current Triumeq therapy.  He is being followed at [facility] for this.    Persistent cough for 3 weeks or longer  Patient had a URI early May and those symptoms resolved but he still has a dry cough.  He has to use an inhaler.  When he lived in [place] he had an inhaler around age 5 but does not believe he was diagnosed with asthma.  He no longer has a sore throat or fever.  No weight loss.      Non-seasonal allergic rhinitis due to pollen  Patient complains of  nasal congestion, rhinorrhea, sneezing itchy, red, irritated eyes  Has tried over the counter medications   History of asthma as a child-  No increased symptoms with exertion.  Symptoms perennial with seasonal exacerbation in spring, fall.     ROS  No fever, chills, sweats.  No productive cough  No sinus pain or pressure  No swollen glands  No rash. No eczema.  Pertinent  ROS findings as above. All other systems reviewed and are negative.       .         Allergies   Allergen Reactions   • Amoxicillin Hives and Rash     Per patient       Current medicines (including changes today)  Current Outpatient Prescriptions   Medication Sig Dispense Refill   • Abacavir-Dolutegravir-Lamivud (TRIUMEQ PO) Take  by mouth.     • azithromycin (ZITHROMAX) 250 MG Tab 2 tabs by mouth day 1, 1 tab by mouth days 2-5 6 Tab 0     No current facility-administered medications for this visit.        He  has a past medical history of Allergy; Anxiety; Deviated septum; HIV (human immunodeficiency virus infection) ([facility]); IBS (irritable bowel syndrome); and IBS (irritable bowel syndrome).    Patient Active Problem List    Diagnosis Date Noted   • Skin lesion of chest wall 04/21/2014     Priority:  "Medium     Class: Present on Arrival   • IBS (irritable bowel syndrome) 01/21/2014     Priority: Medium   • Anxiety 01/21/2014     Priority: Medium   • Non-seasonal allergic rhinitis due to pollen 07/03/2019   • Persistent cough for 3 weeks or longer 07/03/2019   • HIV (human immunodeficiency virus infection) ([facility]) 01/03/2016   • Painful skin lesion 12/14/2015   • Leukopenia 08/17/2015       ROS   No fever or chills.  No nausea or vomiting.  No chest pain or palpitations.    No pain with urination or hematuria.  No black or bloody stools.       Objective:     /72 (BP Location: Left arm, Patient Position: Sitting)   Pulse 86   Temp 37 °C (98.6 °F) (Temporal)   Ht 1.905 m (6' 3\")   Wt 96.4 kg (212 lb 9.6 oz)   SpO2 96%  Body mass index is 26.57 kg/m².   Physical Exam:  Well developed, well nourished.  Alert, oriented in no acute distress.  Eye contact is good, speech goal directed, affect calm  Eyes: conjunctiva non-injected, sclera non-icteric.  Ears: Pinna normal. TM pearly gray.   Nose: Nares are patent.  Normal mucosa  Mouth: Oral mucous membranes pink and moist with no lesions.  Neck Supple.  No adenopathy or masses in the neck or supraclavicular regions. No thyromegaly  Lungs: clear to auscultation bilaterally with good excursion. No wheezes or rhonchi  CV: regular rate and rhythm. No murmur            Assessment and Plan:   The following treatment plan was discussed    1. HIV (human immunodeficiency virus infection) ([facility])  Continue Triumeq and [facility] follow up    2. Non-seasonal allergic rhinitis due to pollen  Education: Discussed that no one medicine likely to provide complete relief. Discussed allergen avoidance and environment modification when possible, showering and shampooing before bed, taking shoes off indoors so not tracking pollen in home.  Discussed that meds more effective with daily use.   Advised Zyrtec or Allegra OTC, Nasal steroid Rx, OTC allergy eye drops prn. If not " effective, consider immunotherapy    3. Persistent cough for 3 weeks or longer  Check chest xray and CBC to rule out lower respiratory infection  - CBC WITH DIFFERENTIAL; Future  - DX-CHEST-2 VIEWS; Future    4. Screening for deficiency anemia  Screening labs ordered.  Await results for counseling.  - CBC WITH DIFFERENTIAL; Future    5. Screening for diabetes mellitus  Screening labs ordered.  Await results for counseling.  - Comp Metabolic Panel; Future    6. Screening for hyperlipidemia  Screening labs ordered.  Await results for counseling.  - Lipid Profile; Future    7. Screening for thyroid disorder  Screening labs ordered.  Await results for counseling.  - TSH; Future  - FREE THYROXINE; Future    8. Encounter for vitamin deficiency screening  Screening labs ordered.  Await results for counseling.  - VITAMIN D,25 HYDROXY; Future    9. Screening for endocrine disorder  Screening labs ordered.  Await results for counseling.  - TESTOSTERONE SERUM; Future    Any change or worsening of signs or symptoms, patient encouraged to follow-up or report to the emergency room for further evaluation. Patient understands and agrees.    Followup: Return if symptoms worsen or fail to improve.

## 2019-07-29 ENCOUNTER — OFFICE VISIT (OUTPATIENT)
Dept: URGENT CARE | Facility: CLINIC | Age: 24
End: 2019-07-29
Payer: COMMERCIAL

## 2019-07-29 VITALS
WEIGHT: 212 LBS | TEMPERATURE: 98.7 F | BODY MASS INDEX: 26.36 KG/M2 | OXYGEN SATURATION: 99 % | HEIGHT: 75 IN | DIASTOLIC BLOOD PRESSURE: 78 MMHG | HEART RATE: 103 BPM | RESPIRATION RATE: 20 BRPM | SYSTOLIC BLOOD PRESSURE: 120 MMHG

## 2019-07-29 DIAGNOSIS — M94.0 COSTOCHONDRITIS: ICD-10-CM

## 2019-07-29 DIAGNOSIS — R06.02 SHORTNESS OF BREATH: ICD-10-CM

## 2019-07-29 PROCEDURE — 94640 AIRWAY INHALATION TREATMENT: CPT | Performed by: PHYSICIAN ASSISTANT

## 2019-07-29 PROCEDURE — 93000 ELECTROCARDIOGRAM COMPLETE: CPT | Performed by: PHYSICIAN ASSISTANT

## 2019-07-29 PROCEDURE — 99214 OFFICE O/P EST MOD 30 MIN: CPT | Mod: 25 | Performed by: PHYSICIAN ASSISTANT

## 2019-07-29 RX ORDER — IPRATROPIUM BROMIDE AND ALBUTEROL SULFATE 2.5; .5 MG/3ML; MG/3ML
3 SOLUTION RESPIRATORY (INHALATION) ONCE
Status: COMPLETED | OUTPATIENT
Start: 2019-07-29 | End: 2019-07-29

## 2019-07-29 RX ADMIN — IPRATROPIUM BROMIDE AND ALBUTEROL SULFATE 3 ML: 2.5; .5 SOLUTION RESPIRATORY (INHALATION) at 18:02

## 2019-07-30 ASSESSMENT — ENCOUNTER SYMPTOMS
ABDOMINAL PAIN: 0
SORE THROAT: 0
HEADACHES: 0
MYALGIAS: 0
BLURRED VISION: 0
NAUSEA: 0
CHILLS: 0
DIZZINESS: 0
BRUISES/BLEEDS EASILY: 0
WHEEZING: 0
VOMITING: 0
PALPITATIONS: 0
COUGH: 0
FEVER: 0
BACK PAIN: 0
EYE PAIN: 0
SHORTNESS OF BREATH: 1

## 2019-07-30 NOTE — PROGRESS NOTES
Subjective:      Nabeel Ladd is a 24 y.o. male who presents with Shortness of Breath (Today SOB and chest tightness)      HPI:  This is a new problem. Nabeel Ladd is a 24 y.o. male who presents today for evaluation of chest tightness and shortness of breath.  Patient states that for the past few months he is actually been experiencing more shortness of breath and is now prescribed an albuterol inhaler by his PCP.  He said he does not have a definitive diagnosis of asthma at this time.  He states that today, however, the shortness of breath has been a little bit worse he is also had some chest tightness associated with it.  He says in addition to this he is been having some sharp intermittent pains in the left side of his chest that lasts for a couple seconds at a time.  He says it is more of an annoyance than anything and he has no associated lightheadedness/dizziness, diaphoresis, or feeling of malaise.  Patient states that he was able to get through his normal workday today without any problems but the fact that he is having pain on the left side of his chest was concerning to him.  He reports no personal or family history of cardiac disease.        Review of Systems   Constitutional: Negative for chills, fever and malaise/fatigue.   HENT: Negative for congestion and sore throat.    Eyes: Negative for blurred vision and pain.   Respiratory: Positive for shortness of breath. Negative for cough and wheezing.    Cardiovascular: Positive for chest pain. Negative for palpitations and leg swelling.   Gastrointestinal: Negative for abdominal pain, nausea and vomiting.   Musculoskeletal: Negative for back pain and myalgias.   Skin: Negative for rash.   Neurological: Negative for dizziness and headaches.   Endo/Heme/Allergies: Does not bruise/bleed easily.       PMH:  has a past medical history of Allergy; Anxiety; Deviated septum; HIV (human immunodeficiency virus infection) ([facility]); IBS (irritable bowel syndrome); and  "IBS (irritable bowel syndrome).  MEDS:   Current Outpatient Prescriptions:   •  Abacavir-Dolutegravir-Lamivud (TRIUMEQ PO), Take  by mouth., Disp: , Rfl:   ALLERGIES:   Allergies   Allergen Reactions   • Amoxicillin Hives and Rash     Per patient     SURGHX:   Past Surgical History:   Procedure Laterality Date   • OTHER      wisdom teeth 8/13     SOCHX:  reports that he has never smoked. He has never used smokeless tobacco. He reports that he drinks about 3.0 oz of alcohol per week . He reports that he uses drugs, including Marijuana, about 3 times per week.  FH: Family history was reviewed, no pertinent findings to report     Objective:     /78   Pulse (!) 103   Temp 37.1 °C (98.7 °F) (Temporal)   Resp 20   Ht 1.905 m (6' 3\")   Wt 96.2 kg (212 lb)   SpO2 99%   BMI 26.50 kg/m²      Physical Exam   Constitutional: He is oriented to person, place, and time. He appears well-developed and well-nourished.   HENT:   Head: Normocephalic and atraumatic.   Right Ear: External ear normal.   Left Ear: External ear normal.   Eyes: Pupils are equal, round, and reactive to light. Conjunctivae are normal.   Neck: Normal range of motion.   Cardiovascular: Normal rate, regular rhythm, normal heart sounds and normal pulses.  PMI is not displaced.    No murmur heard.  Pulmonary/Chest: Effort normal and breath sounds normal. No accessory muscle usage. No respiratory distress. He has no wheezes. He exhibits tenderness.       Lymphadenopathy:     He has no cervical adenopathy.   Neurological: He is alert and oriented to person, place, and time.   Skin: Skin is warm and dry. Capillary refill takes less than 2 seconds.   Psychiatric: He has a normal mood and affect. His behavior is normal. Judgment normal.       EKG Interpretation - HR is 77 normal EKG, normal sinus rhythm, there are no previous tracings available for comparison       *Patient reported that his feeling of chest tightness and shortness of breath went away " almost completely after the nebulizer treatment.  Assessment/Plan:     1. Costochondritis  - EKG - Clinic Performed    2. Shortness of breath  - ipratropium-albuterol (DUONEB) nebulizer solution; 3 mL by Nebulization route Once.  - EKG - Clinic Performed      Based on patient's response to the nebulizer treatment and the fact that his chest pain can be reproduced with palpation, as well as the normal EKG, advised patient that I do not believe there is any cardiac cause to his symptoms right now.  Did discuss strict ER precautions, however.  Patient stated that he had enough of the albuterol inhaler prescribed by his PCP.  Advised him to follow-up with her in the next 1 to 2 weeks as I believe he will need pulmonary function testing for his increased shortness of breath over the past few months.  Advised him to put heat on his chest wall and take ibuprofen over the next few days to help with his symptoms.

## 2020-01-30 ENCOUNTER — OFFICE VISIT (OUTPATIENT)
Dept: MEDICAL GROUP | Facility: LAB | Age: 25
End: 2020-01-30
Payer: COMMERCIAL

## 2020-01-30 VITALS
OXYGEN SATURATION: 97 % | WEIGHT: 211 LBS | RESPIRATION RATE: 16 BRPM | TEMPERATURE: 98.2 F | BODY MASS INDEX: 26.24 KG/M2 | HEIGHT: 75 IN | DIASTOLIC BLOOD PRESSURE: 80 MMHG | HEART RATE: 88 BPM | SYSTOLIC BLOOD PRESSURE: 116 MMHG

## 2020-01-30 DIAGNOSIS — R19.7 DIARRHEA, UNSPECIFIED TYPE: ICD-10-CM

## 2020-01-30 DIAGNOSIS — B20 HIV INFECTION, UNSPECIFIED SYMPTOM STATUS (HCC): ICD-10-CM

## 2020-01-30 DIAGNOSIS — R10.84 GENERALIZED ABDOMINAL CRAMPING: ICD-10-CM

## 2020-01-30 DIAGNOSIS — K21.9 GASTROESOPHAGEAL REFLUX DISEASE WITHOUT ESOPHAGITIS: ICD-10-CM

## 2020-01-30 PROCEDURE — 99214 OFFICE O/P EST MOD 30 MIN: CPT | Performed by: FAMILY MEDICINE

## 2020-01-30 RX ORDER — ABACAVIR SULFATE, DOLUTEGRAVIR SODIUM, LAMIVUDINE 600; 50; 300 MG/1; MG/1; MG/1
TABLET, FILM COATED ORAL
COMMUNITY
Start: 2020-01-22 | End: 2020-01-30

## 2020-01-30 ASSESSMENT — PATIENT HEALTH QUESTIONNAIRE - PHQ9
5. POOR APPETITE OR OVEREATING: 2 - MORE THAN HALF THE DAYS
SUM OF ALL RESPONSES TO PHQ QUESTIONS 1-9: 15
CLINICAL INTERPRETATION OF PHQ2 SCORE: 4

## 2020-01-30 NOTE — PATIENT INSTRUCTIONS
Irritable Bowel Syndrome, Adult  Irritable bowel syndrome (IBS) is not one specific disease. It is a group of symptoms that affects the organs responsible for digestion (gastrointestinal or GI tract).  To regulate how your GI tract works, your body sends signals back and forth between your intestines and your brain. If you have IBS, there may be a problem with these signals. As a result, your GI tract does not function normally. Your intestines may become more sensitive and overreact to certain things. This is especially true when you eat certain foods or when you are under stress.  There are four types of IBS. These may be determined based on the consistency of your stool:  · IBS with diarrhea.  · IBS with constipation.  · Mixed IBS.  · Unsubtyped IBS.  It is important to know which type of IBS you have. Some treatments are more likely to be helpful for certain types of IBS.  What are the causes?  The exact cause of IBS is not known.  What increases the risk?  You may have a higher risk of IBS if:  · You are a woman.  · You are younger than 45 years old.  · You have a family history of IBS.  · You have mental health problems.  · You have had bacterial infection of your GI tract.  What are the signs or symptoms?  Symptoms of IBS vary from person to person. The main symptom is abdominal pain or discomfort. Additional symptoms usually include one or more of the following:  · Diarrhea, constipation, or both.  · Abdominal swelling or bloating.  · Feeling full or sick after eating a small or regular-size meal.  · Frequent gas.  · Mucus in the stool.  · A feeling of having more stool left after a bowel movement.  Symptoms tend to come and go. They may be associated with stress, psychiatric conditions, or nothing at all.  How is this diagnosed?  There is no specific test to diagnose IBS. Your health care provider will make a diagnosis based on a physical exam, medical history, and your symptoms. You may have other tests to  rule out other conditions that may be causing your symptoms. These may include:  · Blood tests.  · X-rays.  · CT scan.  · Endoscopy and colonoscopy. This is a test in which your GI tract is viewed with a long, thin, flexible tube.  How is this treated?  There is no cure for IBS, but treatment can help relieve symptoms. IBS treatment often includes:  · Changes to your diet, such as:  ¨ Eating more fiber.  ¨ Avoiding foods that cause symptoms.  ¨ Drinking more water.  ¨ Eating regular, medium-sized portioned meals.  · Medicines. These may include:  ¨ Fiber supplements if you have constipation.  ¨ Medicine to control diarrhea (antidiarrheal medicines).  ¨ Medicine to help control muscle spasms in your GI tract (antispasmodic medicines).  ¨ Medicines to help with any mental health issues, such as antidepressants or tranquilizers.  · Therapy.  ¨ Talk therapy may help with anxiety, depression, or other mental health issues that can make IBS symptoms worse.  · Stress reduction.  ¨ Managing your stress can help keep symptoms under control.  Follow these instructions at home:  · Take medicines only as directed by your health care provider.  · Eat a healthy diet.  ¨ Avoid foods and drinks with added sugar.  ¨ Include more whole grains, fruits, and vegetables gradually into your diet. This may be especially helpful if you have IBS with constipation.  ¨ Avoid any foods and drinks that make your symptoms worse. These may include dairy products and caffeinated or carbonated drinks.  ¨ Do not eat large meals.  ¨ Drink enough fluid to keep your urine clear or pale yellow.  · Exercise regularly. Ask your health care provider for recommendations of good activities for you.  · Keep all follow-up visits as directed by your health care provider. This is important.  Contact a health care provider if:  · You have constant pain.  · You have trouble or pain with swallowing.  · You have worsening diarrhea.  Get help right away if:  · You  have severe and worsening abdominal pain.  · You have diarrhea and:  ¨ You have a rash, stiff neck, or severe headache.  ¨ You are irritable, sleepy, or difficult to awaken.  ¨ You are weak, dizzy, or extremely thirsty.  · You have bright red blood in your stool or you have black tarry stools.  · You have unusual abdominal swelling that is painful.  · You vomit continuously.  · You vomit blood (hematemesis).  · You have both abdominal pain and a fever.  This information is not intended to replace advice given to you by your health care provider. Make sure you discuss any questions you have with your health care provider.  Document Released: 12/18/2006 Document Revised: 05/19/2017 Document Reviewed: 09/04/2015  [employer] Interactive Patient Education © 2017 [employer]    Food Choices for Gastroesophageal Reflux Disease, Adult  When you have gastroesophageal reflux disease (GERD), the foods you eat and your eating habits are very important. Choosing the right foods can help ease your discomfort.   WHAT GUIDELINES DO I NEED TO FOLLOW?   · Choose fruits, vegetables, whole grains, and low-fat dairy products.    · Choose low-fat meat, fish, and poultry.  · Limit fats such as oils, salad dressings, butter, nuts, and avocado.    · Keep a food diary. This helps you identify foods that cause symptoms.    · Avoid foods that cause symptoms. These may be different for everyone.    · Eat small meals often instead of 3 large meals a day.    · Eat your meals slowly, in a place where you are relaxed.    · Limit fried foods.    · Cook foods using methods other than frying.    · Avoid drinking alcohol.    · Avoid drinking large amounts of liquids with your meals.    · Avoid bending over or lying down until 2-3 hours after eating.    WHAT FOODS ARE NOT RECOMMENDED?   These are some foods and drinks that may make your symptoms worse:  Vegetables  Tomatoes. Tomato juice. Tomato and spaghetti sauce. Chili peppers. Onion and garlic.  Horseradish.  Fruits  Oranges, grapefruit, and lemon (fruit and juice).  Meats  High-fat meats, fish, and poultry. This includes hot dogs, ribs, ham, sausage, salami, and osorio.  Dairy  Whole milk and chocolate milk. Sour cream. Cream. Butter. Ice cream. Cream cheese.   Drinks  Coffee and tea. Bubbly (carbonated) drinks or energy drinks.  Condiments  Hot sauce. Barbecue sauce.   Sweets/Desserts  Chocolate and cocoa. Donuts. Peppermint and spearmint.  Fats and Oils  High-fat foods. This includes French fries and potato chips.  Other  Vinegar. Strong spices. This includes black pepper, white pepper, red pepper, cayenne, child powder, cloves, ginger, and chili powder.  The items listed above may not be a complete list of foods and drinks to avoid. Contact your dietitian for more information.     This information is not intended to replace advice given to you by your health care provider. Make sure you discuss any questions you have with your health care provider.     Document Released: 06/18/2013 Document Revised: 01/08/2016 Document Reviewed: 10/22/2014  [employer] Interactive Patient Education ©2016 [employer]    Gastroesophageal Reflux Disease, Adult  Introduction  Normally, food travels down the esophagus and stays in the stomach to be digested. If a person has gastroesophageal reflux disease (GERD), food and stomach acid move back up into the esophagus. When this happens, the esophagus becomes sore and swollen (inflamed). Over time, GERD can make small holes (ulcers) in the lining of the esophagus.  Follow these instructions at home:  Diet  · Follow a diet as told by your doctor. You may need to avoid foods and drinks such as:  ¨ Coffee and tea (with or without caffeine).  ¨ Drinks that contain alcohol.  ¨ Energy drinks and sports drinks.  ¨ Carbonated drinks or sodas.  ¨ Chocolate and cocoa.  ¨ Peppermint and mint flavorings.  ¨ Garlic and onions.  ¨ Horseradish.  ¨ Spicy and acidic foods, such as peppers, chili  powder, child powder, vinegar, hot sauces, and BBQ sauce.  ¨ Citrus fruit juices and citrus fruits, such as oranges, ericka, and limes.  ¨ Tomato-based foods, such as red sauce, chili, salsa, and pizza with red sauce.  ¨ Fried and fatty foods, such as donuts, french fries, potato chips, and high-fat dressings.  ¨ High-fat meats, such as hot dogs, rib eye steak, sausage, ham, and osorio.  ¨ High-fat dairy items, such as whole milk, butter, and cream cheese.  · Eat small meals often. Avoid eating large meals.  · Avoid drinking large amounts of liquid with your meals.  · Avoid eating meals during the 2-3 hours before bedtime.  · Avoid lying down right after you eat.  · Do not exercise right after you eat.  General instructions  · Pay attention to any changes in your symptoms.  · Take over-the-counter and prescription medicines only as told by your doctor. Do not take aspirin, ibuprofen, or other NSAIDs unless your doctor says it is okay.  · Do not use any tobacco products, including cigarettes, chewing tobacco, and e-cigarettes. If you need help quitting, ask your doctor.  · Wear loose clothes. Do not wear anything tight around your waist.  · Raise (elevate) the head of your bed about 6 inches (15 cm).  · Try to lower your stress. If you need help doing this, ask your doctor.  · If you are overweight, lose an amount of weight that is healthy for you. Ask your doctor about a safe weight loss goal.  · Keep all follow-up visits as told by your doctor. This is important.  Contact a doctor if:  · You have new symptoms.  · You lose weight and you do not know why it is happening.  · You have trouble swallowing, or it hurts to swallow.  · You have wheezing or a cough that keeps happening.  · Your symptoms do not get better with treatment.  · You have a hoarse voice.  Get help right away if:  · You have pain in your arms, neck, jaw, teeth, or back.  · You feel sweaty, dizzy, or light-headed.  · You have chest pain or shortness  of breath.  · You throw up (vomit) and your throw up looks like blood or coffee grounds.  · You pass out (faint).  · Your poop (stool) is bloody or black.  · You cannot swallow, drink, or eat.  This information is not intended to replace advice given to you by your health care provider. Make sure you discuss any questions you have with your health care provider.  Document Released: 06/05/2009 Document Revised: 05/25/2017 Document Reviewed: 04/13/2016  © 2017 [employer]

## 2020-01-30 NOTE — PROGRESS NOTES
Subjective:     Chief Complaint   Patient presents with   • GI Problem     diarrhea, acid reflux       Nabeel Ladd is a 24 y.o. male here today for evaluation and management of:    Generalized abdominal cramping  For the last month he has had periumbilical cramping mostly in the morning and then diarrhea first thing in the am and followed by looser stools.  More acid reflux, belching and hipcups.  Had irritable bowel as an adolescent but it improved.  He hasn't had symptoms since high school.      HIV (human immunodeficiency virus infection) (CMS-HCC)  Diagnosed in 2015 - viral counts are non detectable on his current Triumeq therapy.  He is being followed at [facility] for this.       Allergies   Allergen Reactions   • Amoxicillin Hives and Rash     Per patient       Current medicines (including changes today)  Current Outpatient Medications   Medication Sig Dispense Refill   • Abacavir-Dolutegravir-Lamivud (TRIUMEQ PO) Take  by mouth.       No current facility-administered medications for this visit.        He  has a past medical history of Allergy, Anxiety, Deviated septum, HIV (human immunodeficiency virus infection) ([facility]), IBS (irritable bowel syndrome), and IBS (irritable bowel syndrome).    Patient Active Problem List    Diagnosis Date Noted   • Skin lesion of chest wall 04/21/2014     Priority: Medium     Class: Present on Arrival   • IBS (irritable bowel syndrome) 01/21/2014     Priority: Medium   • Anxiety 01/21/2014     Priority: Medium   • Generalized abdominal cramping 01/30/2020   • Gastroesophageal reflux disease without esophagitis 01/30/2020   • Non-seasonal allergic rhinitis due to pollen 07/03/2019   • Persistent cough for 3 weeks or longer 07/03/2019   • HIV (human immunodeficiency virus infection) ([facility]) 01/03/2016   • Painful skin lesion 12/14/2015   • Leukopenia 08/17/2015       ROS   No fever or chills.  No nausea or vomiting.  No chest pain or palpitations.  No cough or SOB.  No pain with  "urination or hematuria.  No black or bloody stools.       Objective:     /80 (BP Location: Right arm, Patient Position: Sitting, BP Cuff Size: Adult)   Pulse 88   Temp 36.8 °C (98.2 °F) (Temporal)   Resp 16   Ht 1.905 m (6' 3\")   Wt 95.7 kg (211 lb)   SpO2 97%  Body mass index is 26.37 kg/m².   Physical Exam:  Well developed, well nourished.  Alert, oriented in no acute distress.  Eye contact is good, speech goal directed, affect calm  Eyes: conjunctiva non-injected, sclera non-icteric.  Neck Supple.  No adenopathy or masses in the neck or supraclavicular regions. No thyromegaly  Lungs: clear to auscultation bilaterally with good excursion. No wheezes or rhonchi  CV: regular rate and rhythm. No murmur  Abdomen: soft, nontender, no masses or organomegaly.  No rebound or guarding            Assessment and Plan:   The following treatment plan was discussed    1. Generalized abdominal cramping  Check stool for WBCs and Giardia.  Increase fiber intake and information about irritable bowel given.  If testing is all negative and symptoms continue we will refer him to gastroenterology just because of the increased risk of lymphomas in the GI tract and MALT  - CRYPTO/GIARDIA RAPID ASSAY; Future  - STOOL WBC'S; Future    2. Gastroesophageal reflux disease without esophagitis  Check H. pylori.  Consider PPI  - H. PYLORI, UREA BREATH TEST, ADULT; Future    3. Diarrhea, unspecified type  Check stool for WBCs and Giardia.  Increase fiber intake and information about irritable bowel given.  If testing is all negative and symptoms continue we will refer him to gastroenterology just because of the increased risk of lymphomas in the GI tract and MALT  - CRYPTO/GIARDIA RAPID ASSAY; Future  - STOOL WBC'S; Future    4. HIV infection, unspecified symptom status (HCC)  Check stool for WBCs and Giardia.  Increase fiber intake and information about irritable bowel given.  If testing is all negative and symptoms continue we will " refer him to gastroenterology just because of the increased risk of lymphomas in the GI tract and MALT    Any change or worsening of signs or symptoms, patient encouraged to follow-up or report to the emergency room for further evaluation. Patient understands and agrees.    Followup: Return if symptoms worsen or fail to improve.

## 2020-01-30 NOTE — ASSESSMENT & PLAN NOTE
For the last month he has had periumbilical cramping mostly in the morning and then diarrhea first thing in the am and followed by looser stools.  More acid reflux, belching and hipcups.  Had irritable bowel as an adolescent but it improved.  He hasn't had symptoms since high school.

## 2020-01-30 NOTE — ASSESSMENT & PLAN NOTE
Diagnosed in 2015 - viral counts are non detectable on his current Triumeq therapy.  He is being followed at [facility] for this.

## 2020-10-01 ENCOUNTER — PATIENT MESSAGE (OUTPATIENT)
Dept: MEDICAL GROUP | Facility: LAB | Age: 25
End: 2020-10-01

## 2020-10-01 DIAGNOSIS — J02.9 SORE THROAT: ICD-10-CM

## 2021-01-11 ENCOUNTER — HOSPITAL ENCOUNTER (OUTPATIENT)
Dept: LAB | Facility: MEDICAL CENTER | Age: 26
End: 2021-01-11
Attending: NURSE PRACTITIONER
Payer: COMMERCIAL

## 2021-01-11 LAB
BASOPHILS # BLD AUTO: 0.5 % (ref 0–1.8)
BASOPHILS # BLD: 0.03 K/UL (ref 0–0.12)
EOSINOPHIL # BLD AUTO: 0.05 K/UL (ref 0–0.51)
EOSINOPHIL NFR BLD: 0.8 % (ref 0–6.9)
ERYTHROCYTE [DISTWIDTH] IN BLOOD BY AUTOMATED COUNT: 41.6 FL (ref 35.9–50)
HCT VFR BLD AUTO: 52.2 % (ref 42–52)
HGB BLD-MCNC: 17.6 G/DL (ref 14–18)
IMM GRANULOCYTES # BLD AUTO: 0.02 K/UL (ref 0–0.11)
IMM GRANULOCYTES NFR BLD AUTO: 0.3 % (ref 0–0.9)
LYMPHOCYTES # BLD AUTO: 2.16 K/UL (ref 1–4.8)
LYMPHOCYTES NFR BLD: 36.3 % (ref 22–41)
MCH RBC QN AUTO: 31 PG (ref 27–33)
MCHC RBC AUTO-ENTMCNC: 33.7 G/DL (ref 33.7–35.3)
MCV RBC AUTO: 91.9 FL (ref 81.4–97.8)
MONOCYTES # BLD AUTO: 0.36 K/UL (ref 0–0.85)
MONOCYTES NFR BLD AUTO: 6.1 % (ref 0–13.4)
NEUTROPHILS # BLD AUTO: 3.33 K/UL (ref 1.82–7.42)
NEUTROPHILS NFR BLD: 56 % (ref 44–72)
NRBC # BLD AUTO: 0 K/UL
NRBC BLD-RTO: 0 /100 WBC
PLATELET # BLD AUTO: 183 K/UL (ref 164–446)
PMV BLD AUTO: 11.1 FL (ref 9–12.9)
RBC # BLD AUTO: 5.68 M/UL (ref 4.7–6.1)
WBC # BLD AUTO: 6 K/UL (ref 4.8–10.8)

## 2021-01-11 PROCEDURE — 82784 ASSAY IGA/IGD/IGG/IGM EACH: CPT

## 2021-01-11 PROCEDURE — 85025 COMPLETE CBC W/AUTO DIFF WBC: CPT

## 2021-01-11 PROCEDURE — 86140 C-REACTIVE PROTEIN: CPT

## 2021-01-11 PROCEDURE — 84443 ASSAY THYROID STIM HORMONE: CPT

## 2021-01-11 PROCEDURE — 80053 COMPREHEN METABOLIC PANEL: CPT

## 2021-01-11 PROCEDURE — 82248 BILIRUBIN DIRECT: CPT

## 2021-01-11 PROCEDURE — 83516 IMMUNOASSAY NONANTIBODY: CPT

## 2021-01-11 PROCEDURE — 36415 COLL VENOUS BLD VENIPUNCTURE: CPT

## 2021-01-12 ENCOUNTER — HOSPITAL ENCOUNTER (OUTPATIENT)
Facility: MEDICAL CENTER | Age: 26
End: 2021-01-12
Attending: NURSE PRACTITIONER
Payer: COMMERCIAL

## 2021-01-12 LAB
ALBUMIN SERPL BCP-MCNC: 5.2 G/DL (ref 3.2–4.9)
ALBUMIN/GLOB SERPL: 1.8 G/DL
ALP SERPL-CCNC: 80 U/L (ref 30–99)
ALT SERPL-CCNC: 77 U/L (ref 2–50)
ANION GAP SERPL CALC-SCNC: 17 MMOL/L (ref 7–16)
AST SERPL-CCNC: 37 U/L (ref 12–45)
BILIRUB CONJ SERPL-MCNC: <0.2 MG/DL (ref 0.1–0.5)
BILIRUB INDIRECT SERPL-MCNC: NORMAL MG/DL (ref 0–1)
BILIRUB SERPL-MCNC: 1.1 MG/DL (ref 0.1–1.5)
BUN SERPL-MCNC: 12 MG/DL (ref 8–22)
CALCIUM SERPL-MCNC: 10.7 MG/DL (ref 8.5–10.5)
CHLORIDE SERPL-SCNC: 98 MMOL/L (ref 96–112)
CO2 SERPL-SCNC: 24 MMOL/L (ref 20–33)
CREAT SERPL-MCNC: 1.07 MG/DL (ref 0.5–1.4)
CRP SERPL HS-MCNC: 0.32 MG/DL (ref 0–0.75)
GLOBULIN SER CALC-MCNC: 2.9 G/DL (ref 1.9–3.5)
GLUCOSE SERPL-MCNC: 82 MG/DL (ref 65–99)
POTASSIUM SERPL-SCNC: 4 MMOL/L (ref 3.6–5.5)
PROT SERPL-MCNC: 8.1 G/DL (ref 6–8.2)
SODIUM SERPL-SCNC: 139 MMOL/L (ref 135–145)

## 2021-01-12 PROCEDURE — 87329 GIARDIA AG IA: CPT

## 2021-01-12 PROCEDURE — 83993 ASSAY FOR CALPROTECTIN FECAL: CPT

## 2021-01-12 PROCEDURE — 87045 FECES CULTURE AEROBIC BACT: CPT

## 2021-01-12 PROCEDURE — 87328 CRYPTOSPORIDIUM AG IA: CPT

## 2021-01-12 PROCEDURE — 83520 IMMUNOASSAY QUANT NOS NONAB: CPT

## 2021-01-12 PROCEDURE — 87899 AGENT NOS ASSAY W/OPTIC: CPT

## 2021-01-12 PROCEDURE — 87493 C DIFF AMPLIFIED PROBE: CPT

## 2021-01-12 PROCEDURE — 87324 CLOSTRIDIUM AG IA: CPT

## 2021-01-13 LAB
C DIFF DNA SPEC QL NAA+PROBE: NEGATIVE
C DIFF TOX A+B STL QL IA: NEGATIVE
C DIFF TOX GENS STL QL NAA+PROBE: NORMAL
G LAMBLIA+C PARVUM AG STL QL RAPID: NORMAL
GLIADIN PEPTIDE+TTG IGA+IGG SER QL IA: 21 UNITS (ref 0–19)
IGA SERPL-MCNC: 300 MG/DL (ref 68–408)
SIGNIFICANT IND 70042: NORMAL
SITE SITE: NORMAL
SOURCE SOURCE: NORMAL
TTG IGA SER IA-ACNC: <2 U/ML (ref 0–3)

## 2021-01-14 LAB
E COLI SXT1+2 STL IA: NORMAL
GLIADIN IGA SER IA-ACNC: 15 UNITS (ref 0–19)
SIGNIFICANT IND 70042: NORMAL
SITE SITE: NORMAL
SOURCE SOURCE: NORMAL

## 2021-01-15 LAB
BACTERIA STL CULT: NORMAL
C JEJUNI+C COLI AG STL QL: NORMAL
CALPROTECTIN STL-MCNT: <5 UG/G
E COLI SXT1+2 STL IA: NORMAL
GLIADIN IGG SER IA-ACNC: 5 UNITS (ref 0–19)
SIGNIFICANT IND 70042: NORMAL
SITE SITE: NORMAL
SOURCE SOURCE: NORMAL
TEST NAME 95000: NORMAL
TTG IGG SER IA-ACNC: 5 U/ML (ref 0–5)

## 2021-01-16 LAB — ELASTASE PANC STL-MCNT: >800 UG/G

## 2021-06-22 ENCOUNTER — HOSPITAL ENCOUNTER (EMERGENCY)
Facility: MEDICAL CENTER | Age: 26
End: 2021-06-22
Attending: EMERGENCY MEDICINE
Payer: COMMERCIAL

## 2021-06-22 VITALS
HEIGHT: 75 IN | TEMPERATURE: 98.5 F | WEIGHT: 222.66 LBS | RESPIRATION RATE: 16 BRPM | HEART RATE: 72 BPM | SYSTOLIC BLOOD PRESSURE: 142 MMHG | BODY MASS INDEX: 27.69 KG/M2 | DIASTOLIC BLOOD PRESSURE: 81 MMHG | OXYGEN SATURATION: 96 %

## 2021-06-22 DIAGNOSIS — R10.13 EPIGASTRIC PAIN: ICD-10-CM

## 2021-06-22 DIAGNOSIS — K29.00 OTHER ACUTE GASTRITIS WITHOUT HEMORRHAGE: ICD-10-CM

## 2021-06-22 LAB
ALBUMIN SERPL BCP-MCNC: 4.9 G/DL (ref 3.2–4.9)
ALBUMIN/GLOB SERPL: 1.6 G/DL
ALP SERPL-CCNC: 76 U/L (ref 30–99)
ALT SERPL-CCNC: 39 U/L (ref 2–50)
ANION GAP SERPL CALC-SCNC: 10 MMOL/L (ref 7–16)
APPEARANCE UR: CLEAR
AST SERPL-CCNC: 27 U/L (ref 12–45)
BASOPHILS # BLD AUTO: 0.5 % (ref 0–1.8)
BASOPHILS # BLD: 0.03 K/UL (ref 0–0.12)
BILIRUB SERPL-MCNC: 0.9 MG/DL (ref 0.1–1.5)
BILIRUB UR QL STRIP.AUTO: NEGATIVE
BUN SERPL-MCNC: 13 MG/DL (ref 8–22)
CALCIUM SERPL-MCNC: 9.8 MG/DL (ref 8.4–10.2)
CHLORIDE SERPL-SCNC: 101 MMOL/L (ref 96–112)
CO2 SERPL-SCNC: 30 MMOL/L (ref 20–33)
COLOR UR: YELLOW
CREAT SERPL-MCNC: 1.03 MG/DL (ref 0.5–1.4)
EOSINOPHIL # BLD AUTO: 0.07 K/UL (ref 0–0.51)
EOSINOPHIL NFR BLD: 1.2 % (ref 0–6.9)
ERYTHROCYTE [DISTWIDTH] IN BLOOD BY AUTOMATED COUNT: 40.6 FL (ref 35.9–50)
GLOBULIN SER CALC-MCNC: 3 G/DL (ref 1.9–3.5)
GLUCOSE SERPL-MCNC: 96 MG/DL (ref 65–99)
GLUCOSE UR STRIP.AUTO-MCNC: NEGATIVE MG/DL
HCT VFR BLD AUTO: 48.5 % (ref 42–52)
HGB BLD-MCNC: 16.5 G/DL (ref 14–18)
IMM GRANULOCYTES # BLD AUTO: 0.03 K/UL (ref 0–0.11)
IMM GRANULOCYTES NFR BLD AUTO: 0.5 % (ref 0–0.9)
KETONES UR STRIP.AUTO-MCNC: NEGATIVE MG/DL
LEUKOCYTE ESTERASE UR QL STRIP.AUTO: NEGATIVE
LIPASE SERPL-CCNC: 30 U/L (ref 7–58)
LYMPHOCYTES # BLD AUTO: 3.14 K/UL (ref 1–4.8)
LYMPHOCYTES NFR BLD: 53.9 % (ref 22–41)
MCH RBC QN AUTO: 31.1 PG (ref 27–33)
MCHC RBC AUTO-ENTMCNC: 34 G/DL (ref 33.7–35.3)
MCV RBC AUTO: 91.5 FL (ref 81.4–97.8)
MICRO URNS: NORMAL
MONOCYTES # BLD AUTO: 0.37 K/UL (ref 0–0.85)
MONOCYTES NFR BLD AUTO: 6.3 % (ref 0–13.4)
NEUTROPHILS # BLD AUTO: 2.19 K/UL (ref 1.82–7.42)
NEUTROPHILS NFR BLD: 37.6 % (ref 44–72)
NITRITE UR QL STRIP.AUTO: NEGATIVE
NRBC # BLD AUTO: 0 K/UL
NRBC BLD-RTO: 0 /100 WBC
PH UR STRIP.AUTO: 6 [PH] (ref 5–8)
PLATELET # BLD AUTO: 179 K/UL (ref 164–446)
PMV BLD AUTO: 10.6 FL (ref 9–12.9)
POTASSIUM SERPL-SCNC: 4.1 MMOL/L (ref 3.6–5.5)
PROT SERPL-MCNC: 7.9 G/DL (ref 6–8.2)
PROT UR QL STRIP: NEGATIVE MG/DL
RBC # BLD AUTO: 5.3 M/UL (ref 4.7–6.1)
RBC UR QL AUTO: NEGATIVE
SODIUM SERPL-SCNC: 141 MMOL/L (ref 135–145)
SP GR UR STRIP.AUTO: 1
WBC # BLD AUTO: 5.8 K/UL (ref 4.8–10.8)

## 2021-06-22 PROCEDURE — 99284 EMERGENCY DEPT VISIT MOD MDM: CPT

## 2021-06-22 PROCEDURE — A9270 NON-COVERED ITEM OR SERVICE: HCPCS | Performed by: EMERGENCY MEDICINE

## 2021-06-22 PROCEDURE — 81003 URINALYSIS AUTO W/O SCOPE: CPT

## 2021-06-22 PROCEDURE — 83690 ASSAY OF LIPASE: CPT

## 2021-06-22 PROCEDURE — 700102 HCHG RX REV CODE 250 W/ 637 OVERRIDE(OP): Performed by: EMERGENCY MEDICINE

## 2021-06-22 PROCEDURE — 80053 COMPREHEN METABOLIC PANEL: CPT

## 2021-06-22 PROCEDURE — 85025 COMPLETE CBC W/AUTO DIFF WBC: CPT

## 2021-06-22 PROCEDURE — 36415 COLL VENOUS BLD VENIPUNCTURE: CPT

## 2021-06-22 RX ORDER — SUCRALFATE 1 G/1
1 TABLET ORAL
Qty: 42 TABLET | Refills: 0 | Status: SHIPPED | OUTPATIENT
Start: 2021-06-22 | End: 2021-07-06

## 2021-06-22 RX ADMIN — LIDOCAINE HYDROCHLORIDE 30 ML: 20 SOLUTION OROPHARYNGEAL at 01:25

## 2021-06-22 ASSESSMENT — PAIN DESCRIPTION - DESCRIPTORS: DESCRIPTORS: ACHING;TENDER

## 2021-06-22 ASSESSMENT — FIBROSIS 4 INDEX
FIB4 SCORE: 0.6
FIB4 SCORE: 0.61

## 2021-06-22 NOTE — ED TRIAGE NOTES
Patient presents with complaints of a multiple day history of mid/R sided abdominal pain that started Sunday morning. States that he went drinking with friends on Saturday. Denies nausea and vomiting but reports a few episodes of diarrhea.

## 2021-06-22 NOTE — ED TRIAGE NOTES
Abdominal pain started last night. Pt describes the pain as sharp and dull and radiates to RLQ. Denies N/V with minimal diarrhea.

## 2021-06-22 NOTE — ED NOTES
Patient discharged home in stable condition  AVS provided with recommended follow up and home care instructions and education information  Prescription for Carafate provided at this time  Patient verbalized understanding  Ambulatory at time of discharge

## 2021-06-22 NOTE — ED PROVIDER NOTES
ED Provider Note    CHIEF COMPLAINT  Chief Complaint   Patient presents with   • Abdominal Pain     HPI  Patient is a 26-year-old male with a history of acid reflux/GERD who presents emergency room for upper abdominal discomfort.  He states on Saturday he went out drinking with friends and on Sunday morning he awoke with a lot of abdominal pain and discomfort.  This was in the upper abdomen, occasionally radiated to the right into the flank and was intermittently a dull and aching sensation and occasionally sharp.  He denied any nausea or vomiting, no acute diarrheal illness.  He has not had any abdominal distention or rigidity.  He has not had any prior abdominal surgeries.  He did have a upper endoscopy several months ago and this was reported as being normal.  He denies regular alcohol use, denies any chest pain or burning sensations, and denies any chest pressure sensations or shortness of breath.  No bowel changes or bloody bowel movements.    PPE Note: I personally donned full PPE for all patient encounters during this visit, including being clean-shaven with an N95 respirator mask, gloves, and goggles.     REVIEW OF SYSTEMS  See HPI for further details. All other systems are negative.     PAST MEDICAL HISTORY   has a past medical history of Allergy, Anxiety, Deviated septum, HIV (human immunodeficiency virus infection) (HCC), IBS (irritable bowel syndrome), and IBS (irritable bowel syndrome).    SOCIAL HISTORY  Social History     Tobacco Use   • Smoking status: Never Smoker   • Smokeless tobacco: Never Used   Substance and Sexual Activity   • Alcohol use: Yes     Alcohol/week: 3.0 oz     Types: 5 Shots of liquor per week   • Drug use: Yes     Frequency: 3.0 times per week     Types: Marijuana   • Sexual activity: Yes     Partners: Male     Comment: uses condoms       SURGICAL HISTORY   has a past surgical history that includes other.    CURRENT MEDICATIONS  Home Medications    **Home medications have not yet  "been reviewed for this encounter**       ALLERGIES  Allergies   Allergen Reactions   • Amoxicillin Hives and Rash     Per patient     PHYSICAL EXAM  VITAL SIGNS: /81   Pulse 72   Temp 36.9 °C (98.5 °F)   Resp 16   Ht 1.905 m (6' 3\")   Wt 101 kg (222 lb 10.6 oz)   SpO2 96%   BMI 27.83 kg/m²   Pulse ox interpretation: I interpret this pulse ox as normal.  Genl: M sitting in gurney comfortably, speaking clearly, appears in no acute distress   Head: NC/AT   ENT: Mucous membranes moist, posterior pharynx clear, uvula midline, nares patent bilaterally  Pulmonary: Lungs are clear to auscultation bilaterally  Chest: No TTP  CV:  RRR, no murmur appreciated, pulses 2+ in both upper and lower extremities,  Abdomen: soft, epigastric discomfort, negative Liu sign, no McBurney's point tenderness, ND; no rebound/guarding, no masses palpated, no HSM  : no CVA or suprapubic tenderness  Musculoskeletal: Pain free ROM of the neck. Moving upper and lower extremities and spontaneous in coordinated fashion  Neuro: A&Ox4 (person, place, time, situation), speech fluent, gait steady, no focal deficits appreciated, No cerebellar signs. Sensation is grossly intact in the distal upper and lower extremities.  5/5 strength in  and dorsiflexion/plantar flexion of the ankles  Psych: Patient has an appropriate affect and behavior  Skin: No rash or lesions.  No pallor or jaundice.  No cyanosis.  Warm and dry.     DIAGNOSTIC STUDIES / PROCEDURES    LABS  Labs Reviewed   CBC WITH DIFFERENTIAL - Abnormal; Notable for the following components:       Result Value    Neutrophils-Polys 37.60 (*)     Lymphocytes 53.90 (*)     All other components within normal limits   LIPASE   COMP METABOLIC PANEL   URINALYSIS   ESTIMATED GFR     RADIOLOGY  No orders to display     COURSE & MEDICAL DECISION MAKING  Pertinent Labs & Imaging studies reviewed. (See chart for details)    DDX: chemical gastritis, PUD, pancreatitis, alcohol use, " esophagitis, GERD, cholelithiasis, cholecystitis, choledocolithiasis    MDM    Initial evaluation at 0106:  Patient presents emergency room for symptoms as described above.  He has had some element of acid reflux in the past and had a initiation of abdominal discomfort over the course of the day after having a negative drinking.  He presents emergency room several days out and is without any signs of focal peritonitis, he is not febrile and vital signs are otherwise reassuring.  The distribution of pain points towards a broad differential as noted above and serial abdominal exams again her reassuring with no presence of rigidity or progression and I would doubt a ruptured peptic ulcer or life-threatening surgical emergency.  Treated with GI cocktail and on serial evaluations appears improved and at this time I would recommend initiation of Carafate.  He is well-established in the outpatient setting and feels comfortable following up with his gastroenterologist as needed.  Questions are addressed and I did discuss diet changes including abstaining from alcohol, spicy foods, coffee teas and other specific compounds.  He is discharged home in stable condition.    FINAL IMPRESSION  Visit Diagnoses     ICD-10-CM   1. Epigastric pain  R10.13   2. Other acute gastritis without hemorrhage  K29.00     Electronically signed by: Iván Ruvalcaba M.D., 6/22/2021 1:06 AM

## 2021-07-08 ENCOUNTER — OFFICE VISIT (OUTPATIENT)
Dept: MEDICAL GROUP | Facility: MEDICAL CENTER | Age: 26
End: 2021-07-08
Payer: COMMERCIAL

## 2021-07-08 VITALS
HEART RATE: 103 BPM | WEIGHT: 219.6 LBS | SYSTOLIC BLOOD PRESSURE: 122 MMHG | TEMPERATURE: 99 F | DIASTOLIC BLOOD PRESSURE: 84 MMHG | OXYGEN SATURATION: 97 % | BODY MASS INDEX: 27.3 KG/M2 | HEIGHT: 75 IN

## 2021-07-08 DIAGNOSIS — J34.2 DEVIATED SEPTUM: ICD-10-CM

## 2021-07-08 DIAGNOSIS — B20 HIV INFECTION, UNSPECIFIED SYMPTOM STATUS (HCC): ICD-10-CM

## 2021-07-08 DIAGNOSIS — K21.9 GASTROESOPHAGEAL REFLUX DISEASE WITHOUT ESOPHAGITIS: ICD-10-CM

## 2021-07-08 DIAGNOSIS — K58.9 IRRITABLE BOWEL SYNDROME, UNSPECIFIED TYPE: ICD-10-CM

## 2021-07-08 DIAGNOSIS — Z23 NEED FOR VACCINATION: ICD-10-CM

## 2021-07-08 DIAGNOSIS — R41.840 DIFFICULTY CONCENTRATING: ICD-10-CM

## 2021-07-08 DIAGNOSIS — J30.1 NON-SEASONAL ALLERGIC RHINITIS DUE TO POLLEN: ICD-10-CM

## 2021-07-08 PROCEDURE — 90471 IMMUNIZATION ADMIN: CPT | Performed by: NURSE PRACTITIONER

## 2021-07-08 PROCEDURE — 99214 OFFICE O/P EST MOD 30 MIN: CPT | Mod: 25 | Performed by: NURSE PRACTITIONER

## 2021-07-08 PROCEDURE — 90670 PCV13 VACCINE IM: CPT | Performed by: NURSE PRACTITIONER

## 2021-07-08 PROCEDURE — 90734 MENACWYD/MENACWYCRM VACC IM: CPT | Performed by: NURSE PRACTITIONER

## 2021-07-08 PROCEDURE — 90472 IMMUNIZATION ADMIN EACH ADD: CPT | Performed by: NURSE PRACTITIONER

## 2021-07-08 SDOH — ECONOMIC STABILITY: INCOME INSECURITY: HOW HARD IS IT FOR YOU TO PAY FOR THE VERY BASICS LIKE FOOD, HOUSING, MEDICAL CARE, AND HEATING?: SOMEWHAT HARD

## 2021-07-08 SDOH — HEALTH STABILITY: PHYSICAL HEALTH: ON AVERAGE, HOW MANY DAYS PER WEEK DO YOU ENGAGE IN MODERATE TO STRENUOUS EXERCISE (LIKE A BRISK WALK)?: 3 DAYS

## 2021-07-08 SDOH — ECONOMIC STABILITY: HOUSING INSECURITY

## 2021-07-08 SDOH — HEALTH STABILITY: PHYSICAL HEALTH: ON AVERAGE, HOW MANY MINUTES DO YOU ENGAGE IN EXERCISE AT THIS LEVEL?: 60 MIN

## 2021-07-08 SDOH — ECONOMIC STABILITY: FOOD INSECURITY: WITHIN THE PAST 12 MONTHS, THE FOOD YOU BOUGHT JUST DIDN'T LAST AND YOU DIDN'T HAVE MONEY TO GET MORE.: NEVER TRUE

## 2021-07-08 SDOH — ECONOMIC STABILITY: INCOME INSECURITY: IN THE LAST 12 MONTHS, WAS THERE A TIME WHEN YOU WERE NOT ABLE TO PAY THE MORTGAGE OR RENT ON TIME?: NO

## 2021-07-08 SDOH — ECONOMIC STABILITY: HOUSING INSECURITY
IN THE LAST 12 MONTHS, WAS THERE A TIME WHEN YOU DID NOT HAVE A STEADY PLACE TO SLEEP OR SLEPT IN A SHELTER (INCLUDING NOW)?: NO

## 2021-07-08 SDOH — ECONOMIC STABILITY: FOOD INSECURITY: WITHIN THE PAST 12 MONTHS, YOU WORRIED THAT YOUR FOOD WOULD RUN OUT BEFORE YOU GOT MONEY TO BUY MORE.: NEVER TRUE

## 2021-07-08 SDOH — ECONOMIC STABILITY: TRANSPORTATION INSECURITY
IN THE PAST 12 MONTHS, HAS THE LACK OF TRANSPORTATION KEPT YOU FROM MEDICAL APPOINTMENTS OR FROM GETTING MEDICATIONS?: NO

## 2021-07-08 SDOH — ECONOMIC STABILITY: TRANSPORTATION INSECURITY
IN THE PAST 12 MONTHS, HAS LACK OF TRANSPORTATION KEPT YOU FROM MEETINGS, WORK, OR FROM GETTING THINGS NEEDED FOR DAILY LIVING?: NO

## 2021-07-08 SDOH — ECONOMIC STABILITY: TRANSPORTATION INSECURITY
IN THE PAST 12 MONTHS, HAS LACK OF RELIABLE TRANSPORTATION KEPT YOU FROM MEDICAL APPOINTMENTS, MEETINGS, WORK OR FROM GETTING THINGS NEEDED FOR DAILY LIVING?: NO

## 2021-07-08 SDOH — HEALTH STABILITY: MENTAL HEALTH
STRESS IS WHEN SOMEONE FEELS TENSE, NERVOUS, ANXIOUS, OR CAN'T SLEEP AT NIGHT BECAUSE THEIR MIND IS TROUBLED. HOW STRESSED ARE YOU?: VERY MUCH

## 2021-07-08 ASSESSMENT — LIFESTYLE VARIABLES
HOW MANY STANDARD DRINKS CONTAINING ALCOHOL DO YOU HAVE ON A TYPICAL DAY: 3 OR 4
HOW OFTEN DO YOU HAVE A DRINK CONTAINING ALCOHOL: 2-4 TIMES A MONTH
HOW OFTEN DO YOU HAVE SIX OR MORE DRINKS ON ONE OCCASION: LESS THAN MONTHLY

## 2021-07-08 ASSESSMENT — SOCIAL DETERMINANTS OF HEALTH (SDOH)
HOW MANY DRINKS CONTAINING ALCOHOL DO YOU HAVE ON A TYPICAL DAY WHEN YOU ARE DRINKING: 3 OR 4
HOW HARD IS IT FOR YOU TO PAY FOR THE VERY BASICS LIKE FOOD, HOUSING, MEDICAL CARE, AND HEATING?: SOMEWHAT HARD
ARE YOU MARRIED, WIDOWED, DIVORCED, SEPARATED, NEVER MARRIED, OR LIVING WITH A PARTNER?: NEVER MARRIED
HOW OFTEN DO YOU GET TOGETHER WITH FRIENDS OR RELATIVES?: MORE THAN THREE TIMES A WEEK
HOW OFTEN DO YOU GET TOGETHER WITH FRIENDS OR RELATIVES?: MORE THAN THREE TIMES A WEEK
IN A TYPICAL WEEK, HOW MANY TIMES DO YOU TALK ON THE PHONE WITH FAMILY, FRIENDS, OR NEIGHBORS?: THREE TIMES A WEEK
HOW OFTEN DO YOU ATTEND CHURCH OR RELIGIOUS SERVICES?: NEVER
DO YOU BELONG TO ANY CLUBS OR ORGANIZATIONS SUCH AS CHURCH GROUPS UNIONS, FRATERNAL OR ATHLETIC GROUPS, OR SCHOOL GROUPS?: NO
IN A TYPICAL WEEK, HOW MANY TIMES DO YOU TALK ON THE PHONE WITH FAMILY, FRIENDS, OR NEIGHBORS?: THREE TIMES A WEEK
HOW OFTEN DO YOU ATTENT MEETINGS OF THE CLUB OR ORGANIZATION YOU BELONG TO?: NEVER
WITHIN THE PAST 12 MONTHS, YOU WORRIED THAT YOUR FOOD WOULD RUN OUT BEFORE YOU GOT THE MONEY TO BUY MORE: NEVER TRUE
ARE YOU MARRIED, WIDOWED, DIVORCED, SEPARATED, NEVER MARRIED, OR LIVING WITH A PARTNER?: NEVER MARRIED
HOW OFTEN DO YOU HAVE SIX OR MORE DRINKS ON ONE OCCASION: LESS THAN MONTHLY
HOW OFTEN DO YOU ATTENT MEETINGS OF THE CLUB OR ORGANIZATION YOU BELONG TO?: NEVER
HOW OFTEN DO YOU ATTEND CHURCH OR RELIGIOUS SERVICES?: NEVER
HOW OFTEN DO YOU HAVE A DRINK CONTAINING ALCOHOL: 2-4 TIMES A MONTH
DO YOU BELONG TO ANY CLUBS OR ORGANIZATIONS SUCH AS CHURCH GROUPS UNIONS, FRATERNAL OR ATHLETIC GROUPS, OR SCHOOL GROUPS?: NO

## 2021-07-08 ASSESSMENT — PATIENT HEALTH QUESTIONNAIRE - PHQ9
CLINICAL INTERPRETATION OF PHQ2 SCORE: 4
SUM OF ALL RESPONSES TO PHQ QUESTIONS 1-9: 13
5. POOR APPETITE OR OVEREATING: 0 - NOT AT ALL

## 2021-07-08 ASSESSMENT — FIBROSIS 4 INDEX: FIB4 SCORE: 0.63

## 2021-07-08 NOTE — ASSESSMENT & PLAN NOTE
About 1 mo ago he started acupuncture.  He was seen by GI and placed on prilosec.  It made him feel worse.  Acupuncture is helping some.

## 2021-07-08 NOTE — PROGRESS NOTES
Subjective:      Nabeel Ladd is a 26 y.o. male who presents with [facility] care          HPI  Seen to establish [facility].  He has had a long term hx of difficulty concentrating. He feels that it has been getting worse.  He will be in a conversation and not be able to pay attention.  He has seen several counselors and they suggested getting worked up for ADHD.  He does poorly on timed tests.  He thought it was anxiety but can't get passed this.  He is forgetful.  He has some fatigue.  He will have to take naps during day but can't sleep at nite.  He is having difficulty with focusing on things that he doesn't care about.  He is forgetting simple self care.  He is also running late with activities a lot.    Otherwise he is feeling ok.    He is due menactra and prevnar 13.      IBS (irritable bowel syndrome)  bm's are now wnl.  No black tarry stools.  Has chronic stomach pain.  He has dealt with issues but doesn't bother him much anymore.    HIV (human immunodeficiency virus infection) ([facility])  He is followed by [facility]  His labs are stable on meds.      Non-seasonal allergic rhinitis due to pollen  He has seasonal allergies.  He was just tested for allergies. He has both environmental and food allergies.  Not on any meds at this time.  occas uses claritin.  Used more over last 2 wks since outside more.     Gastroesophageal reflux disease without esophagitis  About 1 mo ago he started acupuncture.  He was seen by [facility] and placed on prilosec.  It made him feel worse.  Acupuncture is helping some.      Deviated septum  He has had this surgically repaired.  No further issues.  resolved        Patient Active Problem List    Diagnosis Date Noted   • Deviated septum    • Gastroesophageal reflux disease without esophagitis 01/30/2020   • Non-seasonal allergic rhinitis due to pollen 07/03/2019   • HIV (human immunodeficiency virus infection) ([facility]) 01/03/2016   • IBS (irritable bowel syndrome) 01/21/2014   • Anxiety 01/21/2014      Current Outpatient Medications   Medication Sig Dispense Refill   • SUCRALFATE PO Take 120 mg by mouth 4 times a day.     • Abacavir-Dolutegravir-Lamivud (TRIUMEQ PO) Take  by mouth.       No current facility-administered medications for this visit.     Amoxicillin and [place]  Past Medical History:   Diagnosis Date   • Allergy    • Anxiety    • Deviated septum    • Gastroesophageal reflux disease without esophagitis 1/30/2020   • HIV (human immunodeficiency virus infection) ([facility])    • IBS (irritable bowel syndrome)      Social History     Socioeconomic History   • Marital status: Single     Spouse name: Not on file   • Number of children: Not on file   • Years of education: Not on file   • Highest education level: Associate degree: academic program   Occupational History   • Not on file   Tobacco Use   • Smoking status: Never Smoker   • Smokeless tobacco: Never Used   Vaping Use   • Vaping Use: Never assessed   Substance and Sexual Activity   • Alcohol use: Yes     Alcohol/week: 2.4 oz     Types: 4 Shots of liquor per week   • Drug use: Yes     Frequency: 3.0 times per week     Types: Marijuana   • Sexual activity: Yes     Partners: Male     Comment: uses condoms   Other Topics Concern   • Not on file   Social History Narrative   • Not on file     Social Determinants of Health     Financial Resource Strain: Medium Risk   • Difficulty of Paying Living Expenses: Somewhat hard   Food Insecurity: No Food Insecurity   • Worried About Running Out of Food in the Last Year: Never true   • Ran Out of Food in the Last Year: Never true   Transportation Needs: No Transportation Needs   • Lack of Transportation (Medical): No   • Lack of Transportation (Non-Medical): No   Physical Activity: Sufficiently Active   • Days of Exercise per Week: 3 days   • Minutes of Exercise per Session: 60 min   Stress: Stress Concern Present   • Feeling of Stress : Very much   Social Connections: Socially Isolated   • Frequency of Communication  with Friends and Family: Three times a week   • Frequency of Social Gatherings with Friends and Family: More than three times a week   • Attends Gnosticism Services: Never   • Active Member of Clubs or Organizations: No   • Attends Club or Organization Meetings: Never   • Marital Status: Never    Intimate Partner Violence:    • Fear of Current or Ex-Partner:    • Emotionally Abused:    • Physically Abused:    • Sexually Abused:      Family History   Problem Relation Age of Onset   • Other Mother         sle   • Cancer Mother         breast   • Hypertension Father    • Diabetes Father         IFG   • No Known Problems Sister    • Diabetes Paternal Grandfather    • Heart Disease Paternal Grandfather    • Hypertension Paternal Grandfather    • Cancer Paternal Grandfather    • Stroke Neg Hx        ROS  Review of Systems   Constitutional: Negative.  Negative for fever, chills, weight loss, diaphoresis.   HENT: Negative.  Negative for hearing loss, ear pain, nosebleeds, congestion, sore throat, neck pain, tinnitus and ear discharge.    Eyes: Negative.  Negative for blurred vision, double vision, photophobia, pain, discharge and redness.   Respiratory: Negative.  Negative for cough, hemoptysis, sputum production, shortness of breath, wheezing and stridor.    Cardiovascular: Negative.  Negative for chest pain, palpitations, orthopnea, claudication, leg swelling and PND.   Gastrointestinal: Negative.  Negative for nausea, vomiting, abdominal pain, blood in stool and melena.   Genitourinary: Negative.  Negative for dysuria, urgency, frequency, incontinence, hematuria and flank pain.   Musculoskeletal: Negative.  Negative for myalgias, back pain, joint pain and falls.   Skin: Negative.  Negative for itching and rash.   Neurological: Negative.  Negative for dizziness, tingling, tremors, sensory change, speech change, focal weakness, seizures, loss of consciousness, weakness and headaches.   Endo/Heme/Allergies: Negative.   "Negative for environmental allergies and polydipsia. Does not bruise/bleed easily.   Psychiatric/Behavioral: Negative.  Negative for depression, suicidal ideas, hallucinations, memory loss and substance abuse.   All other systems reviewed and are negative.       Objective:     /84 (BP Location: Right arm, Patient Position: Sitting)   Pulse (!) 103   Temp 37.2 °C (99 °F) (Temporal)   Ht 1.905 m (6' 3\")   Wt 99.6 kg (219 lb 9.6 oz)   SpO2 97%   BMI 27.45 kg/m²      Physical Exam      Physical Exam   Vitals reviewed.  Constitutional: oriented to person, place, and time. appears well-developed and well-nourished. No distress.   HENT: Head: Normocephalic and atraumatic. Bilateral tympanic membranes wnl w/o bulging.  Right Ear: External ear normal. Left Ear: External ear normal. Nose: Nose normal.  Mouth/Throat: Oropharynx is clear and moist. No oropharyngeal exudate. eliza tm wnl. Eyes: Conjunctivae and EOM are normal. Pupils are equal, round, and reactive to light. Right eye exhibits no discharge. Left eye exhibits no discharge. No scleral icterus.    Neck: Normal range of motion. Neck supple. No JVD present.   Cardiovascular: Normal rate, regular rhythm, normal heart sounds and intact distal pulses.  Exam reveals no gallop and no friction rub.  No murmur heard.  No carotid bruits   Pulmonary/Chest: Effort normal and breath sounds normal. No stridor. No respiratory distress. no wheezes or rales. exhibits no tenderness.   Abdominal: Soft. Bowel sounds are normal. exhibits no distension and no mass. No tenderness. no rebound and no guarding.   Musculoskeletal: Normal range of motion. exhibits no edema or tenderness.  eliza pedal pulses 2+.  Lymphadenopathy:  no cervical or supraclavicular adenopathy.   Neurological: alert and oriented to person, place, and time. has normal reflexes. displays normal reflexes. No cranial nerve deficit. exhibits normal muscle tone. Coordination normal.   Skin: Skin is warm and dry. " No rash noted. no diaphoresis. No erythema. No pallor.   Psychiatric: normal mood and affect. behavior is normal.       Assessment/Plan:     1. Difficulty concentrating  REFERRAL TO PSYCHOLOGY    long hx of difficulty concentrating.  refer psych for eval and tx.     2. Gastroesophageal reflux disease without esophagitis  H.PYLORI STOOL ANTIGEN    omeprazole didn't help. seen by [facility].  working with herbalist  now.  cont carafate til complete.  ck stool h pylori.  f/u w/pt w/results.   3. Irritable bowel syndrome, unspecified type      sx controlled w/o med tx.  no current concernes   4. HIV infection, unspecified symptom status (HCC)      stable on meds.  followed by [facility]   5. Non-seasonal allergic rhinitis due to pollen      mild sx only controlled with claritin prn   6. Deviated septum      resolved with surgical repair   7. Need for vaccination  Pneumococcal Conjugate Vaccine 13-Valent    Meningococcal Conjugate Vaccine 4-Valent IM (Menactra)    prevnar 13 and menactra

## 2021-07-08 NOTE — ASSESSMENT & PLAN NOTE
He has seasonal allergies.  He was just tested for allergies. He has both environmental and food allergies.  Not on any meds at this time.  occas uses claritin.  Used more over last 2 wks since outside more.

## 2021-12-28 ENCOUNTER — OFFICE VISIT (OUTPATIENT)
Dept: MEDICAL GROUP | Facility: MEDICAL CENTER | Age: 26
End: 2021-12-28
Payer: COMMERCIAL

## 2021-12-28 VITALS
BODY MASS INDEX: 28.5 KG/M2 | SYSTOLIC BLOOD PRESSURE: 126 MMHG | HEART RATE: 94 BPM | OXYGEN SATURATION: 98 % | WEIGHT: 229.2 LBS | HEIGHT: 75 IN | TEMPERATURE: 97.8 F | DIASTOLIC BLOOD PRESSURE: 84 MMHG

## 2021-12-28 DIAGNOSIS — Z12.5 SCREENING FOR MALIGNANT NEOPLASM OF PROSTATE: ICD-10-CM

## 2021-12-28 DIAGNOSIS — N52.9 ERECTILE DYSFUNCTION, UNSPECIFIED ERECTILE DYSFUNCTION TYPE: ICD-10-CM

## 2021-12-28 DIAGNOSIS — F52.4 ACQUIRED GENERALIZED PREMATURE EJACULATION: ICD-10-CM

## 2021-12-28 DIAGNOSIS — Z13.220 SCREENING FOR HYPERLIPIDEMIA: ICD-10-CM

## 2021-12-28 DIAGNOSIS — Z13.0 SCREENING, ANEMIA, DEFICIENCY, IRON: ICD-10-CM

## 2021-12-28 DIAGNOSIS — Z13.29 SCREENING FOR THYROID DISORDER: ICD-10-CM

## 2021-12-28 DIAGNOSIS — Z13.89 SCREENING FOR MULTIPLE CONDITIONS: ICD-10-CM

## 2021-12-28 DIAGNOSIS — Z13.21 ENCOUNTER FOR VITAMIN DEFICIENCY SCREENING: ICD-10-CM

## 2021-12-28 PROCEDURE — 99214 OFFICE O/P EST MOD 30 MIN: CPT | Performed by: NURSE PRACTITIONER

## 2021-12-28 ASSESSMENT — FIBROSIS 4 INDEX: FIB4 SCORE: 0.63

## 2021-12-28 NOTE — PROGRESS NOTES
Subjective:     Nabeel Ladd is a 26 y.o. male who presents with premature ejaculation.    HPI:   Seen in f/u for premature ejaculation.  He has not had a partner long term but now getting into a new relationship.  Has had this issue long term.  prob one factor is psych issues.  He feels that growing up in a family where being shell is not welcomed did have a factor.  Otherwise no pain with intercourse.  No meds that he is aware of that have effected the ED.  Reviewed poss etiologies.      Patient Active Problem List    Diagnosis Date Noted   • Deviated septum    • Gastroesophageal reflux disease without esophagitis 01/30/2020   • Non-seasonal allergic rhinitis due to pollen 07/03/2019   • HIV (human immunodeficiency virus infection) (HCC) 01/03/2016   • IBS (irritable bowel syndrome) 01/21/2014   • Anxiety 01/21/2014       Current medicines (including changes today)  Current Outpatient Medications   Medication Sig Dispense Refill   • SUCRALFATE PO Take 120 mg by mouth 4 times a day.     • Abacavir-Dolutegravir-Lamivud (TRIUMEQ PO) Take  by mouth.       No current facility-administered medications for this visit.       Allergies   Allergen Reactions   • Amoxicillin Hives and Rash     Per patient   • Citrus Rash     Rash       ROS  Constitutional: Negative. Negative for fever, chills, weight loss, malaise/fatigue and diaphoresis.   HENT: Negative. Negative for hearing loss, ear pain, nosebleeds, congestion, sore throat, neck pain, tinnitus and ear discharge.   Respiratory: Negative. Negative for cough, hemoptysis, sputum production, shortness of breath, wheezing and stridor.   Cardiovascular: Negative. Negative for chest pain, palpitations, orthopnea, claudication, leg swelling and PND.   Gastrointestinal: Denies nausea, vomiting, diarrhea, constipation, heartburn, melena or hematochezia.  Genitourinary: Denies dysuria, hematuria, urinary incontinence, frequency or urgency.        Objective:     /84 (BP  "Location: Right arm, Patient Position: Sitting)   Pulse 94   Temp 36.6 °C (97.8 °F) (Temporal)   Ht 1.905 m (6' 3\")   Wt 104 kg (229 lb 3.2 oz)   SpO2 98%  Body mass index is 28.65 kg/m².    Physical Exam:  Vitals reviewed.  Constitutional: Oriented to person, place, and time. appears well-developed and well-nourished. No distress.   Cardiovascular: Normal rate, regular rhythm, normal heart sounds and intact distal pulses. Exam reveals no gallop and no friction rub. No murmur heard. No carotid bruits.   Pulmonary/Chest: Effort normal and breath sounds normal. No stridor. No respiratory distress. no wheezes or rales. exhibits no tenderness.   Musculoskeletal: Normal range of motion. exhibits no edema. eliza pedal pulses 2+.  Lymphadenopathy: No cervical or supraclavicular adenopathy.   Neurological: Alert and oriented to person, place, and time. exhibits normal muscle tone.  Skin: Skin is warm and dry. No diaphoresis.   Psychiatric: Normal mood and affect. Behavior is normal.      Assessment and Plan:     The following treatment plan was discussed:    1. Acquired generalized premature ejaculation      chk lab.  f/u w/pt w/results.  if wnl start paxil 10 mg. then f/u 1 mo for sx eval.           Followup: Return in about 4 weeks (around 1/25/2022).  "

## 2021-12-30 ENCOUNTER — HOSPITAL ENCOUNTER (OUTPATIENT)
Dept: LAB | Facility: MEDICAL CENTER | Age: 26
End: 2021-12-30
Attending: NURSE PRACTITIONER
Payer: COMMERCIAL

## 2021-12-30 ENCOUNTER — TELEPHONE (OUTPATIENT)
Dept: MEDICAL GROUP | Facility: MEDICAL CENTER | Age: 26
End: 2021-12-30

## 2021-12-30 DIAGNOSIS — Z13.29 SCREENING FOR THYROID DISORDER: ICD-10-CM

## 2021-12-30 DIAGNOSIS — Z13.89 SCREENING FOR MULTIPLE CONDITIONS: ICD-10-CM

## 2021-12-30 DIAGNOSIS — N52.9 ERECTILE DYSFUNCTION, UNSPECIFIED ERECTILE DYSFUNCTION TYPE: ICD-10-CM

## 2021-12-30 DIAGNOSIS — Z13.220 SCREENING FOR HYPERLIPIDEMIA: ICD-10-CM

## 2021-12-30 DIAGNOSIS — Z13.21 ENCOUNTER FOR VITAMIN DEFICIENCY SCREENING: ICD-10-CM

## 2021-12-30 DIAGNOSIS — Z13.0 SCREENING, ANEMIA, DEFICIENCY, IRON: ICD-10-CM

## 2021-12-30 DIAGNOSIS — Z12.5 SCREENING FOR MALIGNANT NEOPLASM OF PROSTATE: ICD-10-CM

## 2021-12-30 LAB
25(OH)D3 SERPL-MCNC: 32 NG/ML (ref 30–100)
ALBUMIN SERPL BCP-MCNC: 5.2 G/DL (ref 3.2–4.9)
ALBUMIN/GLOB SERPL: 1.9 G/DL
ALP SERPL-CCNC: 74 U/L (ref 30–99)
ALT SERPL-CCNC: 46 U/L (ref 2–50)
ANION GAP SERPL CALC-SCNC: 14 MMOL/L (ref 7–16)
AST SERPL-CCNC: 25 U/L (ref 12–45)
BASOPHILS # BLD AUTO: 0.6 % (ref 0–1.8)
BASOPHILS # BLD: 0.03 K/UL (ref 0–0.12)
BILIRUB SERPL-MCNC: 1.1 MG/DL (ref 0.1–1.5)
BUN SERPL-MCNC: 11 MG/DL (ref 8–22)
CALCIUM SERPL-MCNC: 9.9 MG/DL (ref 8.5–10.5)
CHLORIDE SERPL-SCNC: 103 MMOL/L (ref 96–112)
CHOLEST SERPL-MCNC: 170 MG/DL (ref 100–199)
CO2 SERPL-SCNC: 25 MMOL/L (ref 20–33)
CREAT SERPL-MCNC: 0.89 MG/DL (ref 0.5–1.4)
EOSINOPHIL # BLD AUTO: 0.04 K/UL (ref 0–0.51)
EOSINOPHIL NFR BLD: 0.8 % (ref 0–6.9)
ERYTHROCYTE [DISTWIDTH] IN BLOOD BY AUTOMATED COUNT: 45.2 FL (ref 35.9–50)
FASTING STATUS PATIENT QL REPORTED: NORMAL
GLOBULIN SER CALC-MCNC: 2.8 G/DL (ref 1.9–3.5)
GLUCOSE SERPL-MCNC: 79 MG/DL (ref 65–99)
HCT VFR BLD AUTO: 48.1 % (ref 42–52)
HDLC SERPL-MCNC: 47 MG/DL
HGB BLD-MCNC: 16.8 G/DL (ref 14–18)
IMM GRANULOCYTES # BLD AUTO: 0.02 K/UL (ref 0–0.11)
IMM GRANULOCYTES NFR BLD AUTO: 0.4 % (ref 0–0.9)
LDLC SERPL CALC-MCNC: 100 MG/DL
LYMPHOCYTES # BLD AUTO: 2.24 K/UL (ref 1–4.8)
LYMPHOCYTES NFR BLD: 42.5 % (ref 22–41)
MCH RBC QN AUTO: 31.8 PG (ref 27–33)
MCHC RBC AUTO-ENTMCNC: 34.9 G/DL (ref 33.7–35.3)
MCV RBC AUTO: 91.1 FL (ref 81.4–97.8)
MONOCYTES # BLD AUTO: 0.35 K/UL (ref 0–0.85)
MONOCYTES NFR BLD AUTO: 6.6 % (ref 0–13.4)
NEUTROPHILS # BLD AUTO: 2.59 K/UL (ref 1.82–7.42)
NEUTROPHILS NFR BLD: 49.1 % (ref 44–72)
NRBC # BLD AUTO: 0 K/UL
NRBC BLD-RTO: 0 /100 WBC
PLATELET # BLD AUTO: 183 K/UL (ref 164–446)
PMV BLD AUTO: 10.8 FL (ref 9–12.9)
POTASSIUM SERPL-SCNC: 4.2 MMOL/L (ref 3.6–5.5)
PROT SERPL-MCNC: 8 G/DL (ref 6–8.2)
RBC # BLD AUTO: 5.28 M/UL (ref 4.7–6.1)
SODIUM SERPL-SCNC: 142 MMOL/L (ref 135–145)
TRIGL SERPL-MCNC: 117 MG/DL (ref 0–149)
TSH SERPL DL<=0.005 MIU/L-ACNC: 1.16 UIU/ML (ref 0.38–5.33)
VIT B12 SERPL-MCNC: 360 PG/ML (ref 211–911)
WBC # BLD AUTO: 5.3 K/UL (ref 4.8–10.8)

## 2021-12-30 PROCEDURE — 82306 VITAMIN D 25 HYDROXY: CPT

## 2021-12-30 PROCEDURE — 80053 COMPREHEN METABOLIC PANEL: CPT

## 2021-12-30 PROCEDURE — 84443 ASSAY THYROID STIM HORMONE: CPT

## 2021-12-30 PROCEDURE — 80061 LIPID PANEL: CPT

## 2021-12-30 PROCEDURE — 85025 COMPLETE CBC W/AUTO DIFF WBC: CPT

## 2021-12-30 PROCEDURE — 84403 ASSAY OF TOTAL TESTOSTERONE: CPT

## 2021-12-30 PROCEDURE — 84402 ASSAY OF FREE TESTOSTERONE: CPT

## 2021-12-30 PROCEDURE — 84153 ASSAY OF PSA TOTAL: CPT

## 2021-12-30 PROCEDURE — 36415 COLL VENOUS BLD VENIPUNCTURE: CPT

## 2021-12-30 PROCEDURE — 82607 VITAMIN B-12: CPT

## 2021-12-30 PROCEDURE — 84270 ASSAY OF SEX HORMONE GLOBUL: CPT

## 2021-12-30 PROCEDURE — 84154 ASSAY OF PSA FREE: CPT

## 2021-12-31 LAB
PSA FREE MFR SERPL: 40 %
PSA FREE SERPL-MCNC: 0.2 NG/ML
PSA SERPL-MCNC: 0.5 NG/ML (ref 0–4)

## 2022-01-03 LAB
SHBG SERPL-SCNC: 17 NMOL/L (ref 11–80)
TESTOST FREE MFR SERPL: 2.5 % (ref 1.6–2.9)
TESTOST FREE SERPL-MCNC: 116 PG/ML (ref 47–244)
TESTOST SERPL-MCNC: 466 NG/DL (ref 300–1080)

## 2022-01-05 ENCOUNTER — OFFICE VISIT (OUTPATIENT)
Dept: MEDICAL GROUP | Facility: MEDICAL CENTER | Age: 27
End: 2022-01-05
Payer: COMMERCIAL

## 2022-01-05 VITALS
DIASTOLIC BLOOD PRESSURE: 82 MMHG | WEIGHT: 229 LBS | HEART RATE: 101 BPM | HEIGHT: 75 IN | BODY MASS INDEX: 28.47 KG/M2 | OXYGEN SATURATION: 97 % | SYSTOLIC BLOOD PRESSURE: 130 MMHG | TEMPERATURE: 97.2 F

## 2022-01-05 DIAGNOSIS — F52.4 ACQUIRED GENERALIZED PREMATURE EJACULATION: ICD-10-CM

## 2022-01-05 DIAGNOSIS — E56.9 VITAMIN DEFICIENCY: ICD-10-CM

## 2022-01-05 PROCEDURE — 99214 OFFICE O/P EST MOD 30 MIN: CPT | Performed by: NURSE PRACTITIONER

## 2022-01-05 RX ORDER — FLUOXETINE 10 MG/1
10 CAPSULE ORAL DAILY
Qty: 30 CAPSULE | Refills: 1 | Status: SHIPPED | OUTPATIENT
Start: 2022-01-05 | End: 2022-04-20 | Stop reason: SDUPTHER

## 2022-01-05 ASSESSMENT — FIBROSIS 4 INDEX: FIB4 SCORE: 0.52

## 2022-01-06 NOTE — PROGRESS NOTES
Subjective:     Nabeel Ladd is a 26 y.o. male who presents with premature ejaculation.    HPI:   Seen in f/u with premature ejaculation.  He is feeling well.  Reviewed lab with pt.  His GFR, CBC, CMP, LP, TSH, PSA, testosterone is wnl  B12 is low normal.  Not on otc supplement.  Vitamin D is also low normal.  Not on otc supplement.    He has hx of premature ejacuation.  Was on paxil as a teenager.  It made him very fatigued.  Will try prozac. Reviewed risks and benefits of prozac with pt including but not limited to: serotonin syndrome, nausea, poor affect.        Patient Active Problem List    Diagnosis Date Noted   • Deviated septum    • Gastroesophageal reflux disease without esophagitis 01/30/2020   • Non-seasonal allergic rhinitis due to pollen 07/03/2019   • HIV (human immunodeficiency virus infection) ([facility]) 01/03/2016   • IBS (irritable bowel syndrome) 01/21/2014   • Anxiety 01/21/2014       Current medicines (including changes today)  Current Outpatient Medications   Medication Sig Dispense Refill   • FLUoxetine (PROZAC) 10 MG Cap Take 1 Capsule by mouth every day. 30 Capsule 1   • Abacavir-Dolutegravir-Lamivud (TRIUMEQ PO) Take  by mouth.       No current facility-administered medications for this visit.       Allergies   Allergen Reactions   • Amoxicillin Hives and Rash     Per patient   • Citrus Rash     Rash       ROS  Constitutional: Negative. Negative for fever, chills, weight loss, malaise/fatigue and diaphoresis.   HENT: Negative. Negative for hearing loss, ear pain, nosebleeds, congestion, sore throat, neck pain, tinnitus and ear discharge.   Respiratory: Negative. Negative for cough, hemoptysis, sputum production, shortness of breath, wheezing and stridor.   Cardiovascular: Negative. Negative for chest pain, palpitations, orthopnea, claudication, leg swelling and PND.   Gastrointestinal: Denies nausea, vomiting, diarrhea, constipation, heartburn, melena or hematochezia.  Genitourinary: Denies  "dysuria, hematuria, urinary incontinence, frequency or urgency.        Objective:     /82 (BP Location: Right arm, Patient Position: Sitting)   Pulse (!) 101   Temp 36.2 °C (97.2 °F) (Temporal)   Ht 1.905 m (6' 3\")   Wt 104 kg (229 lb)   SpO2 97%  Body mass index is 28.62 kg/m².    Physical Exam:  Vitals reviewed.  Constitutional: Oriented to person, place, and time. appears well-developed and well-nourished. No distress.   Cardiovascular: Normal rate, regular rhythm, normal heart sounds and intact distal pulses. Exam reveals no gallop and no friction rub. No murmur heard. No carotid bruits.   Pulmonary/Chest: Effort normal and breath sounds normal. No stridor. No respiratory distress. no wheezes or rales. exhibits no tenderness.   Musculoskeletal: Normal range of motion. exhibits no edema. eliza pedal pulses 2+.  Lymphadenopathy: No cervical or supraclavicular adenopathy.   Neurological: Alert and oriented to person, place, and time. exhibits normal muscle tone.  Skin: Skin is warm and dry. No diaphoresis.   Psychiatric: Normal mood and affect. Behavior is normal.      Assessment and Plan:     The following treatment plan was discussed:    1. Acquired generalized premature ejaculation  FLUoxetine (PROZAC) 10 MG Cap    try prozac for tx.  f/u 1 mo for sx eval.  email sooner if s/e   2. Vitamin deficiency      b12 and d3 are low normal.  will start otc supplements for both         Followup: Return in about 4 weeks (around 2/2/2022).  "

## 2022-04-20 DIAGNOSIS — F52.4 ACQUIRED GENERALIZED PREMATURE EJACULATION: ICD-10-CM

## 2022-04-20 RX ORDER — FLUOXETINE 10 MG/1
10 CAPSULE ORAL DAILY
Qty: 90 CAPSULE | Refills: 3 | Status: SHIPPED
Start: 2022-04-20 | End: 2022-08-07

## 2022-08-10 ENCOUNTER — OFFICE VISIT (OUTPATIENT)
Dept: URGENT CARE | Facility: CLINIC | Age: 27
End: 2022-08-10
Payer: COMMERCIAL

## 2022-08-10 VITALS
OXYGEN SATURATION: 97 % | RESPIRATION RATE: 18 BRPM | BODY MASS INDEX: 27.6 KG/M2 | TEMPERATURE: 98.9 F | WEIGHT: 222 LBS | HEIGHT: 75 IN | HEART RATE: 110 BPM | SYSTOLIC BLOOD PRESSURE: 124 MMHG | DIASTOLIC BLOOD PRESSURE: 92 MMHG

## 2022-08-10 DIAGNOSIS — K52.9 GASTROENTERITIS: ICD-10-CM

## 2022-08-10 DIAGNOSIS — R19.7 DIARRHEA, UNSPECIFIED TYPE: ICD-10-CM

## 2022-08-10 PROCEDURE — 99214 OFFICE O/P EST MOD 30 MIN: CPT | Performed by: NURSE PRACTITIONER

## 2022-08-10 RX ORDER — ABACAVIR SULFATE, DOLUTEGRAVIR SODIUM, LAMIVUDINE 600; 50; 300 MG/1; MG/1; MG/1
1 TABLET, FILM COATED ORAL DAILY
COMMUNITY
Start: 2022-07-11

## 2022-08-10 ASSESSMENT — FIBROSIS 4 INDEX: FIB4 SCORE: 0.54

## 2022-08-10 NOTE — LETTER
August 10, 2022       Patient: Nabeel Ladd   YOB: 1995   Date of Visit: 8/10/2022         To Whom It May Concern:    In my medical opinion, I recommend that Nabeel Ladd return to full duty, no restrictions on 08/11/22              Sincerely,          SONG Bautista.  Electronically Signed

## 2022-08-10 NOTE — PROGRESS NOTES
"Subjective     Nabeel Ladd is a 27 y.o. male who presents with Diarrhea (X3days, Body aches, fever, vomiting, fatigue, home covid test negative )    Reviewed past medical, surgical and family history. Reviewed prescription and OTC medications with patient in electronic health record today  Allergies: Amoxicillin and Citrus              HPI This is a new problem. Nabeel Ladd is a 27 y.o. patient who presents to urgent care with c/o: diarrhea, vomiting and fever for 3 days. Feeling better today. Diarrhea is watery. No recent travel. No ill contacts, changes in medications. Tmax 101*F 2 days ago. No fever in the past 24 hours. No blood in diarrhea or emesis. Denies abd pain   Treatments tried: keeping hydrated, electrolyte drink   No other aggravating or alleviating factors.       ROS see HPI            Objective     BP (!) 124/92   Pulse (!) 110   Temp 37.2 °C (98.9 °F) (Temporal)   Resp 18   Ht 1.905 m (6' 3\")   Wt 101 kg (222 lb)   SpO2 97%   BMI 27.75 kg/m²      Physical Exam  Vitals and nursing note reviewed.   Constitutional:       General: He is not in acute distress.     Appearance: Normal appearance. He is well-developed. He is not toxic-appearing or diaphoretic.   HENT:      Head: Normocephalic.   Cardiovascular:      Rate and Rhythm: Normal rate and regular rhythm.      Pulses: Normal pulses.      Heart sounds: Normal heart sounds.   Pulmonary:      Effort: Pulmonary effort is normal.      Breath sounds: Normal breath sounds.   Abdominal:      Palpations: Abdomen is soft.      Tenderness: There is abdominal tenderness. There is no guarding.   Musculoskeletal:         General: Normal range of motion.   Skin:     General: Skin is warm and dry.      Capillary Refill: Capillary refill takes less than 2 seconds.   Neurological:      Mental Status: He is alert and oriented to person, place, and time.      Deep Tendon Reflexes: Reflexes are normal and symmetric.   Psychiatric:         Mood and Affect: Mood " normal.         Behavior: Behavior normal. Behavior is cooperative.               Assessment & Plan        1. Gastroenteritis  Discussed  the etiology and pathophysiology of gastroenteritis.  This is a viral illness.   If vomiting may start with clear liquid diet for 24 hours taking small sips very frequently.  May use pedialyte, Gatorade, ginger ale, or sprite.  After 24 hours and vomiting has subsided may start a bland diet such as bananas, rice, applesauce, toast, crackers, mashed potatoes, chicken noodle soup, cream of wheat.   Go to ER for signs of dehydration or can't keep small sips down. Discussed s/s of dehydration including dry sticky mouth, no urine in 8 hrs.  Return to clinic if fever greater than 5 days, bloody vomit or diarrhea, diarrhea greater than 10 days, vomiting greater than 3 days.       2. Diarrhea, unspecified type  OTC antidiarrhea medication of choice if having >  5 liquid stools per day.       Fu prn new or worsening sx or if no improvement in 3-4 days.      I have spent  32  minutes on the care Nabeel in urgent care today This includes preparing for visit which includes review of previous visits if available in EMR, obtaining HPI, exam and evaluation of patient, ordering and independent interpretation of labs, imaging, tests, medical management, counseling, education and documentation.

## 2023-01-24 ENCOUNTER — HOSPITAL ENCOUNTER (EMERGENCY)
Facility: MEDICAL CENTER | Age: 28
End: 2023-01-24
Attending: EMERGENCY MEDICINE
Payer: COMMERCIAL

## 2023-01-24 ENCOUNTER — APPOINTMENT (OUTPATIENT)
Dept: RADIOLOGY | Facility: MEDICAL CENTER | Age: 28
End: 2023-01-24
Attending: EMERGENCY MEDICINE
Payer: COMMERCIAL

## 2023-01-24 VITALS
TEMPERATURE: 98.8 F | SYSTOLIC BLOOD PRESSURE: 121 MMHG | OXYGEN SATURATION: 94 % | RESPIRATION RATE: 18 BRPM | DIASTOLIC BLOOD PRESSURE: 67 MMHG | HEART RATE: 96 BPM

## 2023-01-24 DIAGNOSIS — R00.0 TACHYCARDIA: ICD-10-CM

## 2023-01-24 DIAGNOSIS — R50.9 FEVER, UNSPECIFIED FEVER CAUSE: ICD-10-CM

## 2023-01-24 DIAGNOSIS — E86.0 DEHYDRATION: ICD-10-CM

## 2023-01-24 DIAGNOSIS — R07.89 CHEST WALL PAIN: ICD-10-CM

## 2023-01-24 DIAGNOSIS — J02.9 VIRAL PHARYNGITIS: ICD-10-CM

## 2023-01-24 DIAGNOSIS — J06.9 VIRAL URI WITH COUGH: ICD-10-CM

## 2023-01-24 LAB
ALBUMIN SERPL BCP-MCNC: 4.6 G/DL (ref 3.2–4.9)
ALBUMIN/GLOB SERPL: 1.6 G/DL
ALP SERPL-CCNC: 70 U/L (ref 30–99)
ALT SERPL-CCNC: 51 U/L (ref 2–50)
ANION GAP SERPL CALC-SCNC: 11 MMOL/L (ref 7–16)
APPEARANCE UR: CLEAR
AST SERPL-CCNC: 25 U/L (ref 12–45)
BASOPHILS # BLD AUTO: 0.3 % (ref 0–1.8)
BASOPHILS # BLD: 0.03 K/UL (ref 0–0.12)
BILIRUB SERPL-MCNC: 1.5 MG/DL (ref 0.1–1.5)
BILIRUB UR QL STRIP.AUTO: NEGATIVE
BUN SERPL-MCNC: 14 MG/DL (ref 8–22)
CALCIUM ALBUM COR SERPL-MCNC: 8.9 MG/DL (ref 8.5–10.5)
CALCIUM SERPL-MCNC: 9.4 MG/DL (ref 8.4–10.2)
CHLORIDE SERPL-SCNC: 100 MMOL/L (ref 96–112)
CO2 SERPL-SCNC: 24 MMOL/L (ref 20–33)
COLOR UR: YELLOW
CREAT SERPL-MCNC: 1.1 MG/DL (ref 0.5–1.4)
D DIMER PPP IA.FEU-MCNC: 0.28 UG/ML (FEU) (ref 0–0.5)
EKG IMPRESSION: NORMAL
EOSINOPHIL # BLD AUTO: 0.02 K/UL (ref 0–0.51)
EOSINOPHIL NFR BLD: 0.2 % (ref 0–6.9)
ERYTHROCYTE [DISTWIDTH] IN BLOOD BY AUTOMATED COUNT: 40.9 FL (ref 35.9–50)
FLUAV RNA SPEC QL NAA+PROBE: NEGATIVE
FLUBV RNA SPEC QL NAA+PROBE: NEGATIVE
GFR SERPLBLD CREATININE-BSD FMLA CKD-EPI: 94 ML/MIN/1.73 M 2
GLOBULIN SER CALC-MCNC: 2.8 G/DL (ref 1.9–3.5)
GLUCOSE SERPL-MCNC: 94 MG/DL (ref 65–99)
GLUCOSE UR STRIP.AUTO-MCNC: NEGATIVE MG/DL
HCT VFR BLD AUTO: 43.9 % (ref 42–52)
HGB BLD-MCNC: 15.6 G/DL (ref 14–18)
IMM GRANULOCYTES # BLD AUTO: 0.04 K/UL (ref 0–0.11)
IMM GRANULOCYTES NFR BLD AUTO: 0.4 % (ref 0–0.9)
KETONES UR STRIP.AUTO-MCNC: NEGATIVE MG/DL
LACTATE SERPL-SCNC: 1.8 MMOL/L (ref 0.5–2)
LEUKOCYTE ESTERASE UR QL STRIP.AUTO: NEGATIVE
LYMPHOCYTES # BLD AUTO: 1.68 K/UL (ref 1–4.8)
LYMPHOCYTES NFR BLD: 14.8 % (ref 22–41)
MCH RBC QN AUTO: 31.5 PG (ref 27–33)
MCHC RBC AUTO-ENTMCNC: 35.5 G/DL (ref 33.7–35.3)
MCV RBC AUTO: 88.5 FL (ref 81.4–97.8)
MICRO URNS: NORMAL
MONOCYTES # BLD AUTO: 0.57 K/UL (ref 0–0.85)
MONOCYTES NFR BLD AUTO: 5 % (ref 0–13.4)
NEUTROPHILS # BLD AUTO: 9.02 K/UL (ref 1.82–7.42)
NEUTROPHILS NFR BLD: 79.3 % (ref 44–72)
NITRITE UR QL STRIP.AUTO: NEGATIVE
NRBC # BLD AUTO: 0 K/UL
NRBC BLD-RTO: 0 /100 WBC
PH UR STRIP.AUTO: 6 [PH] (ref 5–8)
PLATELET # BLD AUTO: 168 K/UL (ref 164–446)
PMV BLD AUTO: 10.7 FL (ref 9–12.9)
POTASSIUM SERPL-SCNC: 3.8 MMOL/L (ref 3.6–5.5)
PROT SERPL-MCNC: 7.4 G/DL (ref 6–8.2)
PROT UR QL STRIP: NEGATIVE MG/DL
RBC # BLD AUTO: 4.96 M/UL (ref 4.7–6.1)
RBC UR QL AUTO: NEGATIVE
RSV RNA SPEC QL NAA+PROBE: NEGATIVE
S PYO DNA SPEC NAA+PROBE: NOT DETECTED
SARS-COV-2 RNA RESP QL NAA+PROBE: NOTDETECTED
SODIUM SERPL-SCNC: 135 MMOL/L (ref 135–145)
SP GR UR STRIP.AUTO: 1.01
SPECIMEN SOURCE: NORMAL
WBC # BLD AUTO: 11.4 K/UL (ref 4.8–10.8)

## 2023-01-24 PROCEDURE — 93005 ELECTROCARDIOGRAM TRACING: CPT | Performed by: EMERGENCY MEDICINE

## 2023-01-24 PROCEDURE — 85025 COMPLETE CBC W/AUTO DIFF WBC: CPT

## 2023-01-24 PROCEDURE — 700111 HCHG RX REV CODE 636 W/ 250 OVERRIDE (IP): Performed by: EMERGENCY MEDICINE

## 2023-01-24 PROCEDURE — 0241U HCHG SARS-COV-2 COVID-19 NFCT DS RESP RNA 4 TRGT MIC: CPT

## 2023-01-24 PROCEDURE — 83605 ASSAY OF LACTIC ACID: CPT

## 2023-01-24 PROCEDURE — 80053 COMPREHEN METABOLIC PANEL: CPT

## 2023-01-24 PROCEDURE — A9270 NON-COVERED ITEM OR SERVICE: HCPCS | Performed by: EMERGENCY MEDICINE

## 2023-01-24 PROCEDURE — 99284 EMERGENCY DEPT VISIT MOD MDM: CPT

## 2023-01-24 PROCEDURE — 85379 FIBRIN DEGRADATION QUANT: CPT

## 2023-01-24 PROCEDURE — 700102 HCHG RX REV CODE 250 W/ 637 OVERRIDE(OP): Performed by: EMERGENCY MEDICINE

## 2023-01-24 PROCEDURE — 87651 STREP A DNA AMP PROBE: CPT

## 2023-01-24 PROCEDURE — 87086 URINE CULTURE/COLONY COUNT: CPT

## 2023-01-24 PROCEDURE — 700105 HCHG RX REV CODE 258: Performed by: EMERGENCY MEDICINE

## 2023-01-24 PROCEDURE — 81003 URINALYSIS AUTO W/O SCOPE: CPT

## 2023-01-24 PROCEDURE — 36415 COLL VENOUS BLD VENIPUNCTURE: CPT

## 2023-01-24 PROCEDURE — 71045 X-RAY EXAM CHEST 1 VIEW: CPT

## 2023-01-24 PROCEDURE — 93005 ELECTROCARDIOGRAM TRACING: CPT

## 2023-01-24 PROCEDURE — 96374 THER/PROPH/DIAG INJ IV PUSH: CPT

## 2023-01-24 PROCEDURE — C9803 HOPD COVID-19 SPEC COLLECT: HCPCS | Performed by: EMERGENCY MEDICINE

## 2023-01-24 PROCEDURE — 87040 BLOOD CULTURE FOR BACTERIA: CPT | Mod: 91

## 2023-01-24 RX ORDER — DEXAMETHASONE 4 MG/1
10 TABLET ORAL ONCE
Status: COMPLETED | OUTPATIENT
Start: 2023-01-24 | End: 2023-01-24

## 2023-01-24 RX ORDER — SODIUM CHLORIDE 9 MG/ML
1000 INJECTION, SOLUTION INTRAVENOUS ONCE
Status: COMPLETED | OUTPATIENT
Start: 2023-01-24 | End: 2023-01-24

## 2023-01-24 RX ORDER — KETOROLAC TROMETHAMINE 30 MG/ML
30 INJECTION, SOLUTION INTRAMUSCULAR; INTRAVENOUS ONCE
Status: COMPLETED | OUTPATIENT
Start: 2023-01-24 | End: 2023-01-24

## 2023-01-24 RX ADMIN — SODIUM CHLORIDE 1000 ML: 9 INJECTION, SOLUTION INTRAVENOUS at 21:02

## 2023-01-24 RX ADMIN — KETOROLAC TROMETHAMINE 30 MG: 30 INJECTION, SOLUTION INTRAMUSCULAR; INTRAVENOUS at 21:02

## 2023-01-24 RX ADMIN — DEXAMETHASONE 10 MG: 4 TABLET ORAL at 23:09

## 2023-01-24 NOTE — Clinical Note
Nabeel Ladd was seen and treated in our emergency department on 1/24/2023.  He may return to work on 01/27/2023.       If you have any questions or concerns, please don't hesitate to call.      Milad Hanson M.D.

## 2023-01-25 NOTE — ED NOTES
Discharged in stable condition, alert and oriented, not in any form of distress. Verbalized Feeling better. Health Education given and follow up instructed. Pt verbalized understanding of the information given.

## 2023-01-25 NOTE — ED PROVIDER NOTES
ED Provider Note    CHIEF COMPLAINT  Chief Complaint   Patient presents with    Cough     Off and on for the past month. Productive in nature.     Chest Wall Pain     Pain increased with breathing and coughing.        EXTERNAL RECORDS REVIEWED  Outpatient Notes August 2022 primary care visit for viral illness    HPI/ROS  LIMITATION TO HISTORY   Select: : None  OUTSIDE HISTORIAN(S):  Parent father at bedside    Nabeel Ladd is a 27 y.o. HIV-positive male who presents with 3 weeks of cough.  He states he has developed fever, sweats, worsening cough in the last 2 days.  Activity worsens his cough.  Patient stated he tested positive for influenza A when the cough for started.  He felt like he was getting better, then symptoms have worsened in the past 2 days.  No vomiting or diarrhea.  No rash.  He complains of associated sore throat and nasal congestion.    PAST MEDICAL HISTORY   has a past medical history of Allergy, Anxiety, Deviated septum, Gastroesophageal reflux disease without esophagitis (1/30/2020), HIV (human immunodeficiency virus infection) (HCC), and IBS (irritable bowel syndrome).    SURGICAL HISTORY   has a past surgical history that includes other and septal reconstruction.    FAMILY HISTORY  Family History   Problem Relation Age of Onset    Other Mother         sle    Cancer Mother         breast    Hypertension Father     Diabetes Father         IFG    No Known Problems Sister     Diabetes Paternal Grandfather     Heart Disease Paternal Grandfather     Hypertension Paternal Grandfather     Cancer Paternal Grandfather     Stroke Neg Hx        SOCIAL HISTORY  Social History     Tobacco Use    Smoking status: Never    Smokeless tobacco: Never   Vaping Use    Vaping Use: Never used   Substance and Sexual Activity    Alcohol use: Yes     Alcohol/week: 2.4 oz     Types: 4 Shots of liquor per week    Drug use: Yes     Frequency: 3.0 times per week     Types: Marijuana, Inhaled    Sexual activity: Yes      Partners: Male     Comment: uses condoms       CURRENT MEDICATIONS  Home Medications       Reviewed by Chuck Dan R.N. (Registered Nurse) on 01/24/23 at 2024  Med List Status: Not Addressed     Medication Last Dose Status   FLUoxetine HCl (PROZAC PO)  Active   TRIUMEQ 600- MG Tab  Active                    ALLERGIES  Allergies   Allergen Reactions    Amoxicillin Hives and Rash     Per patient    Citrus Rash     Rash       PHYSICAL EXAM  VITAL SIGNS: /77   Pulse (!) 106   Temp (!) 38.3 °C (100.9 °F) (Oral)   Resp 20   SpO2 94%    Constitutional: Well-nourished in no distress  Cardiac: Heart is regular, tachycardic  Respiratory: Good bilateral breath sounds without crackles or wheezing  ENT: Erythematous swollen posterior pharynx without exudate.  No oral thrush  GI: Abdomen is soft, nontender, no distention, no pain over McBurney's point  Musculoskeletal: No flank pain.  Neck is supple, no meningeal signs.  Sternal tenderness      DIAGNOSTIC STUDIES / PROCEDURES  EKG  I have independently interpreted this EKG  See below    LABS  Results for orders placed or performed during the hospital encounter of 01/24/23   Lactic acid (lactate)   Result Value Ref Range    Lactic Acid 1.8 0.5 - 2.0 mmol/L   CBC With Differential   Result Value Ref Range    WBC 11.4 (H) 4.8 - 10.8 K/uL    RBC 4.96 4.70 - 6.10 M/uL    Hemoglobin 15.6 14.0 - 18.0 g/dL    Hematocrit 43.9 42.0 - 52.0 %    MCV 88.5 81.4 - 97.8 fL    MCH 31.5 27.0 - 33.0 pg    MCHC 35.5 (H) 33.7 - 35.3 g/dL    RDW 40.9 35.9 - 50.0 fL    Platelet Count 168 164 - 446 K/uL    MPV 10.7 9.0 - 12.9 fL    Neutrophils-Polys 79.30 (H) 44.00 - 72.00 %    Lymphocytes 14.80 (L) 22.00 - 41.00 %    Monocytes 5.00 0.00 - 13.40 %    Eosinophils 0.20 0.00 - 6.90 %    Basophils 0.30 0.00 - 1.80 %    Immature Granulocytes 0.40 0.00 - 0.90 %    Nucleated RBC 0.00 /100 WBC    Neutrophils (Absolute) 9.02 (H) 1.82 - 7.42 K/uL    Lymphs (Absolute) 1.68 1.00 - 4.80 K/uL     Monos (Absolute) 0.57 0.00 - 0.85 K/uL    Eos (Absolute) 0.02 0.00 - 0.51 K/uL    Baso (Absolute) 0.03 0.00 - 0.12 K/uL    Immature Granulocytes (abs) 0.04 0.00 - 0.11 K/uL    NRBC (Absolute) 0.00 K/uL   Comp Metabolic Panel   Result Value Ref Range    Sodium 135 135 - 145 mmol/L    Potassium 3.8 3.6 - 5.5 mmol/L    Chloride 100 96 - 112 mmol/L    Co2 24 20 - 33 mmol/L    Anion Gap 11.0 7.0 - 16.0    Glucose 94 65 - 99 mg/dL    Bun 14 8 - 22 mg/dL    Creatinine 1.10 0.50 - 1.40 mg/dL    Calcium 9.4 8.4 - 10.2 mg/dL    AST(SGOT) 25 12 - 45 U/L    ALT(SGPT) 51 (H) 2 - 50 U/L    Alkaline Phosphatase 70 30 - 99 U/L    Total Bilirubin 1.5 0.1 - 1.5 mg/dL    Albumin 4.6 3.2 - 4.9 g/dL    Total Protein 7.4 6.0 - 8.2 g/dL    Globulin 2.8 1.9 - 3.5 g/dL    A-G Ratio 1.6 g/dL   Urinalysis    Specimen: Urine, Clean Catch   Result Value Ref Range    Color Yellow     Character Clear     Specific Gravity 1.010 <1.035    Ph 6.0 5.0 - 8.0    Glucose Negative Negative mg/dL    Ketones Negative Negative mg/dL    Protein Negative Negative mg/dL    Bilirubin Negative Negative    Nitrite Negative Negative    Leukocyte Esterase Negative Negative    Occult Blood Negative Negative    Micro Urine Req see below    CoV-2, FLU A/B, and RSV by PCR (2-4 Hours CEPHEID) : Collect NP swab in VTM    Specimen: Respirate   Result Value Ref Range    Influenza virus A RNA Negative Negative    Influenza virus B, PCR Negative Negative    RSV, PCR Negative Negative    SARS-CoV-2 by PCR NotDetected     SARS-CoV-2 Source NP Swab    D-DIMER   Result Value Ref Range    D-Dimer 0.28 0.00 - 0.50 ug/mL (FEU)   CORRECTED CALCIUM   Result Value Ref Range    Correct Calcium 8.9 8.5 - 10.5 mg/dL   ESTIMATED GFR   Result Value Ref Range    GFR (CKD-EPI) 94 >60 mL/min/1.73 m 2   Group A Strep by PCR   Result Value Ref Range    Group A Strep by PCR Not Detected Not Detected   EKG   Result Value Ref Range    Report       [facility] Emergency  Dept.    Test Date:  2023  Pt Name:    ANJU HANNON                 Department: Health system  MRN:        [id number]                      Room:  Gender:     Male                         Technician: [id number]  :        1995                   Requested By:ER TRIAGE PROTOCOL  Order #:    [id number]                    Reading MD: JUMANA BARNES MD    Measurements  Intervals                                Axis  Rate:       120                          P:          44  KY:         124                          QRS:        25  QRSD:       84                           T:          -8  QT:         302  QTc:        427    Interpretive Statements  Sinus tachycardia  Borderline T abnormalities, inferior leads  No previous ECG available for comparison  Electronically Signed On 2023 23:05:51 PST by JUMANA BARNES MD           RADIOLOGY  I have independently interpreted the diagnostic imaging associated with this visit and am waiting the final reading from the radiologist.   My preliminary interpretation is a follows: No infiltrate    Radiology reading:  DX-CHEST-PORTABLE (1 VIEW)   Final Result         1.  No acute cardiopulmonary disease.            COURSE & MEDICAL DECISION MAKING    ED Observation Status? Yes; I am placing the patient in to an observation status due to a diagnostic uncertainty as well as therapeutic intensity. Patient placed in observation status at 835 PM, 2023.     Observation plan is as follows: Treatment reassessment of abnormal vital signs including tachycardia and fever.  Hydration in setting of possible dehydration.  Evaluation for worsening cough, possibility of pneumonia, pulmonary embolism, pneumothorax    Upon Reevaluation, the patient's condition has: Improved; and will be discharged.    Patient discharged from ED Observation status at 11:15 PM (Time) 2023 (Date).     INITIAL ASSESSMENT AND PLAN  Care Narrative: Patient presents febrile and tachycardic, possibly dehydrated  versus tachycardia secondary to fever.  Pain and fever treated with Toradol with improvement.  After liter of hydration, heart rate is down to 100.  Strep test obtained, negative.  Pharyngitis appears to be viral in nature.  He is tolerating oral intake, no respiratory distress at this time.  Chest x-ray was negative for pneumonia.  Negative D-dimer rules out pulmonary embolism.  Slight elevation of white blood cell count at 11.5, I believe this to be a viral syndrome.  Patient is tested negative for influenza, COVID and RSV.  Patient is advised to rest, maintain hydration, follow-up with his doctor for recheck if no improvement later this week.  Patient requested medication to help with his throat swelling, was given a single dose of Decadron.  He is encouraged to use Tylenol or Motrin for control of his fever and pain.  No evidence of meningitis, on exam his neck was supple without stiffness.  Urinalysis is negative for infection.  Patient is discharged in care of his father improved    ADDITIONAL PROBLEM LIST AND DISPOSITION  HIV: White blood cell count 11.4.  Patient to continue his antiviral medication, follow-up with his doctor for recheck      Escalation of care considered, and ultimately not performed:acute inpatient care management, however at this time, the patient is most appropriate for outpatient management    Barriers to care at this time, including but not limited to:  None .     Decision tools and prescription drugs considered including, but not limited to: Antibiotics not indicated .    Patient given IV hydration for evidence of dehydration in the setting of viral syndrome, heart rate improved.  Patient feeling improved        FINAL DIAGNOSIS  1. Viral URI with cough    2. Fever, unspecified fever cause    3. Tachycardia    4. Dehydration    5. Viral pharyngitis    6. Chest wall pain           Electronically signed by: Milad Hanson M.D., 1/24/2023 11:03 PM

## 2023-01-25 NOTE — ED TRIAGE NOTES
Patient presents to the ER with the following complaints:    Chief Complaint   Patient presents with    Cough     Off and on for the past month. Productive in nature.     Chest Wall Pain     Pain increased with breathing and coughing.        /80   Pulse (!) 128   Temp (!) 38.3 °C (100.9 °F) (Oral)   Resp 20   SpO2 97%

## 2023-01-25 NOTE — DISCHARGE INSTRUCTIONS
See a doctor for recheck if no improvement in 3 days, return if worse or for any concerns.    Check MyChart for results of viral testing then act accordingly.  If COVID-positive, socially distance and isolate, refer to CDC.gov for return to work instructions

## 2023-01-27 LAB
BACTERIA UR CULT: NORMAL
SIGNIFICANT IND 70042: NORMAL
SITE SITE: NORMAL
SOURCE SOURCE: NORMAL

## 2023-01-29 LAB
BACTERIA BLD CULT: NORMAL
BACTERIA BLD CULT: NORMAL
SIGNIFICANT IND 70042: NORMAL
SIGNIFICANT IND 70042: NORMAL
SITE SITE: NORMAL
SITE SITE: NORMAL
SOURCE SOURCE: NORMAL
SOURCE SOURCE: NORMAL

## 2023-01-30 ENCOUNTER — OFFICE VISIT (OUTPATIENT)
Dept: MEDICAL GROUP | Facility: MEDICAL CENTER | Age: 28
End: 2023-01-30
Payer: COMMERCIAL

## 2023-01-30 VITALS
WEIGHT: 236 LBS | OXYGEN SATURATION: 99 % | HEART RATE: 92 BPM | DIASTOLIC BLOOD PRESSURE: 100 MMHG | SYSTOLIC BLOOD PRESSURE: 140 MMHG | HEIGHT: 75 IN | BODY MASS INDEX: 29.34 KG/M2 | TEMPERATURE: 97.6 F | RESPIRATION RATE: 16 BRPM

## 2023-01-30 DIAGNOSIS — R79.89 ELEVATED LFTS: ICD-10-CM

## 2023-01-30 DIAGNOSIS — J06.9 URI WITH COUGH AND CONGESTION: ICD-10-CM

## 2023-01-30 DIAGNOSIS — H66.90 ACUTE OTITIS MEDIA, UNSPECIFIED OTITIS MEDIA TYPE: ICD-10-CM

## 2023-01-30 DIAGNOSIS — E56.9 VITAMIN DEFICIENCY: ICD-10-CM

## 2023-01-30 DIAGNOSIS — Z13.220 SCREENING FOR HYPERLIPIDEMIA: ICD-10-CM

## 2023-01-30 DIAGNOSIS — R05.2 SUBACUTE COUGH: ICD-10-CM

## 2023-01-30 DIAGNOSIS — J02.9 SORE THROAT: ICD-10-CM

## 2023-01-30 DIAGNOSIS — R03.0 ELEVATED BLOOD-PRESSURE READING, WITHOUT DIAGNOSIS OF HYPERTENSION: ICD-10-CM

## 2023-01-30 DIAGNOSIS — J06.9 UPPER RESPIRATORY TRACT INFECTION, UNSPECIFIED TYPE: ICD-10-CM

## 2023-01-30 DIAGNOSIS — E53.8 DISORDER OF VITAMIN B12: ICD-10-CM

## 2023-01-30 DIAGNOSIS — D72.9 ABNORMAL WHITE BLOOD CELL: ICD-10-CM

## 2023-01-30 PROCEDURE — 99214 OFFICE O/P EST MOD 30 MIN: CPT | Performed by: NURSE PRACTITIONER

## 2023-01-30 RX ORDER — CODEINE PHOSPHATE/GUAIFENESIN 10-100MG/5
5 LIQUID (ML) ORAL 3 TIMES DAILY PRN
Qty: 120 ML | Refills: 0 | Status: SHIPPED | OUTPATIENT
Start: 2023-01-30 | End: 2023-02-14

## 2023-01-30 RX ORDER — AZITHROMYCIN 250 MG/1
TABLET, FILM COATED ORAL
Qty: 6 TABLET | Refills: 0 | Status: SHIPPED | OUTPATIENT
Start: 2023-01-30 | End: 2023-02-04

## 2023-01-30 RX ORDER — BENZONATATE 100 MG/1
100 CAPSULE ORAL 3 TIMES DAILY PRN
Qty: 30 CAPSULE | Refills: 0 | Status: SHIPPED
Start: 2023-01-30 | End: 2023-07-26

## 2023-01-30 ASSESSMENT — FIBROSIS 4 INDEX: FIB4 SCORE: 0.56

## 2023-01-31 NOTE — PROGRESS NOTES
Subjective:     Nabeel Ladd is a 27 y.o. male who presents with flu symptoms.    HPI:   Seen in f/u for flu symptoms.  He was diagnosed with flu   He is still coughing with green yellow secretions.  Initially it was brown.  He has not had any more fevers.  + headache from coughing all the times.  today he woke up with left eye crusted dg.  Eye was mildly red.  No vision change.  He was initially dx around [place].  COVID negative then.  He was + for influenza on 12/19/23.  He was strep negative in UC this weekend.  Mostly in am his secretions are yellow.  He stll has the sore throat.  He was having elevated blood pressures last year.  He was on adderall.  He has since gone off med and did lifestyle changes.  His bp has since come down but elevated today.   Reviewed lab years lab with pt.  His CMP showed mildly elevated single LFT.  Will need duc.    His vitamin d and b12 were low normal.  He is not on supplements.    He is on healthy diet.  Was active until recent illness.  His recent CBC done in ER over the weekend showed wbc elevated to 11.      Patient Active Problem List    Diagnosis Date Noted    Deviated septum     Gastroesophageal reflux disease without esophagitis 01/30/2020    Non-seasonal allergic rhinitis due to pollen 07/03/2019    HIV (human immunodeficiency virus infection) (HCC) 01/03/2016    IBS (irritable bowel syndrome) 01/21/2014    Anxiety 01/21/2014       Current medicines (including changes today)  Current Outpatient Medications   Medication Sig Dispense Refill    benzonatate (TESSALON) 100 MG Cap Take 1 Capsule by mouth 3 times a day as needed for Cough. 30 Capsule 0    guaifenesin-codeine (TUSSI-ORGANIDIN NR) 100-10 MG/5ML syrup Take 5 mL by mouth 3 times a day as needed for Cough for up to 15 days. 120 mL 0    azithromycin (ZITHROMAX) 250 MG Tab 2 tabs by mouth day 1, 1 tab by mouth days 2-5 6 Tablet 0    TRIUMEQ 600- MG Tab Take 1 Tablet by mouth every day.       No current  "facility-administered medications for this visit.       Allergies   Allergen Reactions    Amoxicillin Hives and Rash     Per patient    Citrus Rash     Rash       ROS  Constitutional: Negative. Negative for fever, chills, weight loss, diaphoresis.   HENT: Negative. Negative for hearing loss, ear pain, nosebleeds, neck pain, tinnitus and ear discharge.   Respiratory: Negative. Negative for hemoptysis, shortness of breath, wheezing and stridor.   Cardiovascular: Negative. Negative for chest pain, palpitations, orthopnea, claudication, leg swelling and PND.   Gastrointestinal: Denies nausea, vomiting, diarrhea, constipation, heartburn, melena or hematochezia.  Genitourinary: Denies dysuria, hematuria, urinary incontinence, frequency or urgency.        Objective:     BP (!) 140/100   Pulse 92   Temp 36.4 °C (97.6 °F) (Temporal)   Resp 16   Ht 1.905 m (6' 3\")   Wt 107 kg (236 lb)   SpO2 99%  Body mass index is 29.5 kg/m².    Physical Exam:  Physical Exam   Vitals reviewed.  Constitutional: oriented to person, place, and time. appears well-developed and well-nourished. No distress.   HENT:  Head: Normocephalic and atraumatic. Right Ear: External ear normal. Left Ear: External ear normal. Nose: Nose normal. Mouth/Throat: Oropharynx is red, clear and moist. No oropharyngeal exudate.  lt tm wnl.  Rt tm red and bulging.Eyes: Right eye exhibits no discharge. Left eye exhibits no discharge. No scleral icterus.  Neck: No JVD present.  Cardiovascular: Normal rate, regular rhythm, normal heart sounds and intact distal pulses.  Exam reveals no gallop and no friction rub.  No murmur heard.  No carotid bruits.   Pulmonary/Chest: Effort normal and breath sounds normal. No stridor. No respiratory distress. no wheezes or rales. exhibits no tenderness.   Musculoskeletal: Normal range of motion. exhibits no edema. eliza pedal pulses 2+.  Lymphadenopathy: no cervical or supraclavicular adenopathy.   Neurological: alert and oriented to " person, place, and time. exhibits normal muscle tone. Coordination normal.   Skin: Skin is warm and dry. no diaphoresis.   Psychiatric: normal mood and affect. behavior is normal.        Assessment and Plan:     The following treatment plan was discussed:    1. Acute otitis media, unspecified otitis media type  azithromycin (ZITHROMAX) 250 MG Tab    rt ear infection.  zpak as directed.        2. URI with cough and congestion  azithromycin (ZITHROMAX) 250 MG Tab    danya w/cod, tessalon for perles, zyrtec & flonase for sinus congestion. zpak for ear infection and URI.        3. Elevated LFTs  Comp Metabolic Panel    noted on last lab.  repeat lab when completely well.  f/u for review      4. Sore throat  benzonatate (TESSALON) 100 MG Cap    guaifenesin-codeine (TUSSI-ORGANIDIN NR) 100-10 MG/5ML syrup    zpak as directed.  he will discuss with surgeon but recommended cancelling friday sinus surgery      5. Subacute cough  benzonatate (TESSALON) 100 MG Cap    guaifenesin-codeine (TUSSI-ORGANIDIN NR) 100-10 MG/5ML syrup    robitussin with codeine at nite and tessalon during day for cough.  f/u if SOB, FEVER, purulent secertions in cough.       6. Elevated blood-pressure reading, without diagnosis of hypertension  Comp Metabolic Panel    MICROALBUMIN CREAT RATIO URINE    Lipid Profile    bp elevated but had improved with lifestyle changes.  do lab and f/u for review.       7. Upper respiratory tract infection, unspecified type  benzonatate (TESSALON) 100 MG Cap    guaifenesin-codeine (TUSSI-ORGANIDIN NR) 100-10 MG/5ML syrup    sx persisted for 6 weeks.  take zpak.  danya w/cod and tessalon perles for cough.  zyrtec and flonase for nasal congestion.  f/u if sxn ot improved with tx.      8. Vitamin deficiency  VITAMIN D,25 HYDROXY (DEFICIENCY)    do lab with vitamin d, b12, cmp, lp.  F/U FOR review.       9. Disorder of vitamin B12  VITAMIN B12    last b12 was low normal.  reckeck lab.      10. Abnormal white blood cell  CBC  WITH DIFFERENTIAL    was seen on recent CBC in ER d/t illness. duc lab when well. f/u for review.       11. Screening for hyperlipidemia  Lipid Profile    do lab and fu for review 1mo            Followup: Return in about 4 weeks (around 2/27/2023), or for lab review.

## 2023-07-26 ENCOUNTER — OFFICE VISIT (OUTPATIENT)
Dept: MEDICAL GROUP | Facility: MEDICAL CENTER | Age: 28
End: 2023-07-26
Payer: COMMERCIAL

## 2023-07-26 VITALS
SYSTOLIC BLOOD PRESSURE: 104 MMHG | DIASTOLIC BLOOD PRESSURE: 70 MMHG | OXYGEN SATURATION: 97 % | TEMPERATURE: 98.5 F | BODY MASS INDEX: 28.86 KG/M2 | HEART RATE: 89 BPM | RESPIRATION RATE: 17 BRPM | HEIGHT: 76 IN | WEIGHT: 237 LBS

## 2023-07-26 DIAGNOSIS — K21.00 GASTROESOPHAGEAL REFLUX DISEASE WITH ESOPHAGITIS WITHOUT HEMORRHAGE: ICD-10-CM

## 2023-07-26 PROCEDURE — 99214 OFFICE O/P EST MOD 30 MIN: CPT | Performed by: NURSE PRACTITIONER

## 2023-07-26 PROCEDURE — 3078F DIAST BP <80 MM HG: CPT | Performed by: NURSE PRACTITIONER

## 2023-07-26 PROCEDURE — 3074F SYST BP LT 130 MM HG: CPT | Performed by: NURSE PRACTITIONER

## 2023-07-26 RX ORDER — OMEPRAZOLE 20 MG/1
20 CAPSULE, DELAYED RELEASE ORAL DAILY
Qty: 30 CAPSULE | Refills: 1 | Status: SHIPPED | OUTPATIENT
Start: 2023-07-26

## 2023-07-26 RX ORDER — SUCRALFATE ORAL 1 G/10ML
1 SUSPENSION ORAL 4 TIMES DAILY
Qty: 360 ML | Refills: 0 | Status: SHIPPED | OUTPATIENT
Start: 2023-07-26

## 2023-07-26 ASSESSMENT — FIBROSIS 4 INDEX: FIB4 SCORE: 0.58

## 2023-07-26 ASSESSMENT — PATIENT HEALTH QUESTIONNAIRE - PHQ9: CLINICAL INTERPRETATION OF PHQ2 SCORE: 0

## 2023-07-27 NOTE — PROGRESS NOTES
Subjective:     Nabeel Ladd is a 28 y.o. male who presents with epigastric pain.    HPI:   Seen in f/u for epigastric pain.  Pain started about 1 wk ago.  Pain with worse lying down.  Lastnite radiated to RUQ.  He gets GERD after eating but the burning sensation is worst lying down.  Will wake him up at nite.  He has severe GERD sx.  Using tums for the last week.  No black tarry stool.  He has been having diarrhea with his IBS-D. When he gets his acid reflux he will get whole piece of food coming up into mouth along with white frothy. He had EGD 2 years ago that showed eos.  He was seen allergist for the eos.  Had med but not taking now. No black tarry stools.      Patient Active Problem List    Diagnosis Date Noted    Deviated septum     Gastroesophageal reflux disease without esophagitis 01/30/2020    Non-seasonal allergic rhinitis due to pollen 07/03/2019    HIV (human immunodeficiency virus infection) ([facility]) 01/03/2016    IBS (irritable bowel syndrome) 01/21/2014    Anxiety 01/21/2014       Current medicines (including changes today)  Current Outpatient Medications   Medication Sig Dispense Refill    sucralfate (CARAFATE) 1 GM/10ML Suspension Take 10 mL by mouth 4 times a day. 360 mL 0    omeprazole (PRILOSEC) 20 MG delayed-release capsule Take 1 Capsule by mouth every day. 30 Capsule 1    TRIUMEQ 600- MG Tab Take 1 Tablet by mouth every day.       No current facility-administered medications for this visit.       Allergies   Allergen Reactions    Amoxicillin Hives and Rash     Per patient    Citrus Rash     Rash    [place] Bioflavonoid Rash     Rash       ROS  Constitutional: Negative. Negative for fever, chills, weight loss, malaise/fatigue and diaphoresis.   HENT: Negative. Negative for hearing loss, ear pain, nosebleeds, congestion, sore throat, neck pain, tinnitus and ear discharge.   Respiratory: Negative. Negative for cough, hemoptysis, sputum production, shortness of breath, wheezing and stridor.  "  Cardiovascular: Negative. Negative for chest pain, palpitations, orthopnea, claudication, leg swelling and PND.   Gastrointestinal: Denies nausea, vomiting, diarrhea, constipation, melena or hematochezia.  Genitourinary: Denies dysuria, hematuria, urinary incontinence, frequency or urgency.        Objective:     /70 (BP Location: Right arm, Patient Position: Sitting, BP Cuff Size: Adult)   Pulse 89   Temp 36.9 °C (98.5 °F) (Temporal)   Resp 17   Ht 1.93 m (6' 4\")   Wt 108 kg (237 lb)   SpO2 97%  Body mass index is 28.85 kg/m².    Physical Exam:  Physical Exam   Vitals reviewed.  Constitutional: oriented to person, place, and time. appears well-developed and well-nourished. No distress.   Cardiovascular: Normal rate, regular rhythm, normal heart sounds and intact distal pulses.  Exam reveals no gallop and no friction rub.  No murmur heard.  No carotid bruits.   Pulmonary/Chest: Effort normal and breath sounds normal. No stridor. No respiratory distress. no wheezes or rales. exhibits no tenderness.   Musculoskeletal: Normal range of motion. exhibits no edema. eliza pedal pulses 2+.  Lymphadenopathy: no cervical or supraclavicular adenopathy.   Abd:  No CVAT,  Soft,  Bs noted in all quadrants.  No HSM.  No abdominal tenderness.  Neurological: alert and oriented to person, place, and time. exhibits normal muscle tone. Coordination normal.   Skin: Skin is warm and dry. no diaphoresis.   Psychiatric: normal mood and affect. behavior is normal.        Assessment and Plan:     The following treatment plan was discussed:    1. Gastroesophageal reflux disease with esophagitis without hemorrhage  H.PYLORI STOOL ANTIGEN    sucralfate (CARAFATE) 1 GM/10ML Suspension    omeprazole (PRILOSEC) 20 MG delayed-release capsule    Referral to Gastroenterology    sx severe. ck stool for h pylori. take carafate x 1 mo qid, omeprazole x 1 mo. refer GI.  f/u w/pt w/results to tests            Followup: No follow-ups on file.  "

## 2023-08-01 ENCOUNTER — HOSPITAL ENCOUNTER (OUTPATIENT)
Facility: MEDICAL CENTER | Age: 28
End: 2023-08-01
Attending: NURSE PRACTITIONER
Payer: COMMERCIAL

## 2023-08-01 DIAGNOSIS — K21.00 GASTROESOPHAGEAL REFLUX DISEASE WITH ESOPHAGITIS WITHOUT HEMORRHAGE: ICD-10-CM

## 2023-08-01 LAB — H PYLORI AG STL QL IA: NOT DETECTED

## 2023-08-01 PROCEDURE — 87338 HPYLORI STOOL AG IA: CPT

## 2023-09-19 ENCOUNTER — APPOINTMENT (OUTPATIENT)
Dept: URGENT CARE | Facility: CLINIC | Age: 28
End: 2023-09-19

## 2023-09-19 ENCOUNTER — OFFICE VISIT (OUTPATIENT)
Dept: URGENT CARE | Facility: CLINIC | Age: 28
End: 2023-09-19
Payer: COMMERCIAL

## 2023-09-19 VITALS
WEIGHT: 230 LBS | BODY MASS INDEX: 28.01 KG/M2 | SYSTOLIC BLOOD PRESSURE: 122 MMHG | TEMPERATURE: 98.6 F | RESPIRATION RATE: 18 BRPM | DIASTOLIC BLOOD PRESSURE: 80 MMHG | HEIGHT: 76 IN | OXYGEN SATURATION: 95 % | HEART RATE: 116 BPM

## 2023-09-19 DIAGNOSIS — J06.9 VIRAL URI: ICD-10-CM

## 2023-09-19 LAB
FLUAV RNA SPEC QL NAA+PROBE: NEGATIVE
FLUBV RNA SPEC QL NAA+PROBE: NEGATIVE
RSV RNA SPEC QL NAA+PROBE: NEGATIVE
S PYO DNA SPEC NAA+PROBE: NOT DETECTED
SARS-COV-2 RNA RESP QL NAA+PROBE: NEGATIVE

## 2023-09-19 PROCEDURE — 99213 OFFICE O/P EST LOW 20 MIN: CPT | Performed by: REGISTERED NURSE

## 2023-09-19 PROCEDURE — 87651 STREP A DNA AMP PROBE: CPT | Performed by: REGISTERED NURSE

## 2023-09-19 PROCEDURE — 0241U POCT CEPHEID COV-2, FLU A/B, RSV - PCR: CPT | Performed by: REGISTERED NURSE

## 2023-09-19 PROCEDURE — 3079F DIAST BP 80-89 MM HG: CPT | Performed by: REGISTERED NURSE

## 2023-09-19 PROCEDURE — 3074F SYST BP LT 130 MM HG: CPT | Performed by: REGISTERED NURSE

## 2023-09-19 RX ORDER — ACETAMINOPHEN 500 MG
1000 TABLET ORAL EVERY 6 HOURS PRN
COMMUNITY

## 2023-09-19 ASSESSMENT — ENCOUNTER SYMPTOMS
DIZZINESS: 0
ABDOMINAL PAIN: 0
CHILLS: 1
COUGH: 1
SHORTNESS OF BREATH: 0
SORE THROAT: 1
HEADACHES: 0
FEVER: 0
MYALGIAS: 0

## 2023-09-19 ASSESSMENT — FIBROSIS 4 INDEX: FIB4 SCORE: 0.58

## 2023-09-19 NOTE — LETTER
September 19, 2023         Patient: Nabeel Ladd   YOB: 1995   Date of Visit: 9/19/2023           To Whom it May Concern:    Nabeel Ladd was seen in my clinic on 9/19/2023. He may return to work on 09/22/23.    If you have any questions or concerns, please don't hesitate to call.        Sincerely,           CJ Perez  Electronically Signed     
10:04

## 2023-09-19 NOTE — PROGRESS NOTES
Subjective:   Nabeel Ladd is a 28 y.o. male who presents for Other (X 2 days, was feeling chills, had fever (102), fever didn't break, painful to swallow, no energy, Covid exposure, saw white in the back of throat, was feeling warm today )    HPI  Sunday developed fever tmax 102, chills, sore throat, painful to swallow. Fever has since resolved. Confirmed exposure to covid-19. Hx of HIV, on meds, undetecable viral load. Using OTC medications with good response. Tolerating PO. No antibiotic in past 3 months. Immunizations current.    Review of Systems   Constitutional:  Positive for chills. Negative for fever.   HENT:  Positive for congestion, ear pain and sore throat.    Respiratory:  Positive for cough. Negative for shortness of breath.    Cardiovascular:  Negative for chest pain.   Gastrointestinal:  Negative for abdominal pain.   Musculoskeletal:  Negative for myalgias.   Skin:  Negative for rash.   Neurological:  Negative for dizziness and headaches.       Allergies   Allergen Reactions    Amoxicillin Hives and Rash     Per patient    Citrus Rash     Rash    [place] Bioflavonoid Rash     Rash       Patient Active Problem List    Diagnosis Date Noted    Deviated septum     Gastroesophageal reflux disease without esophagitis 01/30/2020    Non-seasonal allergic rhinitis due to pollen 07/03/2019    HIV (human immunodeficiency virus infection) (HCC) 01/03/2016    IBS (irritable bowel syndrome) 01/21/2014    Anxiety 01/21/2014       Current Outpatient Medications Ordered in Epic   Medication Sig Dispense Refill    acetaminophen (TYLENOL) 500 MG Tab Take 1,000 mg by mouth every 6 hours as needed.      omeprazole (PRILOSEC) 20 MG delayed-release capsule Take 1 Capsule by mouth every day. 30 Capsule 1    TRIUMEQ 600- MG Tab Take 1 Tablet by mouth every day.      sucralfate (CARAFATE) 1 GM/10ML Suspension Take 10 mL by mouth 4 times a day. (Patient not taking: Reported on 9/19/2023) 360 mL 0     No current  "Epic-ordered facility-administered medications on file.       Past Surgical History:   Procedure Laterality Date    OTHER      wisdom teeth 8/13    SEPTAL RECONSTRUCTION         Social History     Tobacco Use    Smoking status: Never    Smokeless tobacco: Never   Vaping Use    Vaping Use: Never used   Substance Use Topics    Alcohol use: Yes     Alcohol/week: 2.4 oz     Types: 4 Shots of liquor per week     Comment: occ    Drug use: Not Currently     Frequency: 3.0 times per week     Types: Marijuana, Inhaled       family history includes Cancer in his mother and paternal grandfather; Diabetes in his father and paternal grandfather; Heart Disease in his paternal grandfather; Hypertension in his father and paternal grandfather; No Known Problems in his sister; Other in his mother.     Problem list, medications, and allergies reviewed by myself today in Epic.     Objective:   /80 (BP Location: Left arm, Patient Position: Sitting, BP Cuff Size: Adult)   Pulse (!) 116   Temp 37 °C (98.6 °F) (Temporal)   Resp 18   Ht 1.93 m (6' 4\")   Wt 104 kg (230 lb)   SpO2 95%   BMI 28.00 kg/m²     Physical Exam  Vitals and nursing note reviewed.   Constitutional:       General: He is not in acute distress.     Appearance: Normal appearance. He is well-developed. He is not ill-appearing, toxic-appearing or diaphoretic.   HENT:      Head: Normocephalic and atraumatic.      Jaw: There is normal jaw occlusion.      Right Ear: Tympanic membrane, ear canal and external ear normal.      Left Ear: Tympanic membrane, ear canal and external ear normal.      Nose: Congestion present. No rhinorrhea.      Mouth/Throat:      Mouth: Mucous membranes are moist.      Pharynx: Oropharynx is clear. Uvula midline. Posterior oropharyngeal erythema present. No oropharyngeal exudate.      Tonsils: 1+ on the right. 1+ on the left.   Eyes:      General:         Right eye: No discharge.         Left eye: No discharge.      Conjunctiva/sclera: " Conjunctivae normal.   Cardiovascular:      Rate and Rhythm: Regular rhythm. Tachycardia present.      Pulses: Normal pulses.      Heart sounds: Normal heart sounds. No murmur heard.  Pulmonary:      Effort: Pulmonary effort is normal. No respiratory distress.      Breath sounds: Normal breath sounds. No wheezing, rhonchi or rales.   Chest:      Chest wall: No tenderness.   Musculoskeletal:         General: No swelling or tenderness.      Cervical back: Normal range of motion and neck supple.      Right lower leg: No edema.      Left lower leg: No edema.   Lymphadenopathy:      Cervical: Cervical adenopathy present.   Skin:     General: Skin is warm and dry.   Neurological:      General: No focal deficit present.      Mental Status: He is alert and oriented to person, place, and time. Mental status is at baseline.   Psychiatric:         Mood and Affect: Mood normal.         Behavior: Behavior normal.         Thought Content: Thought content normal.         Judgment: Judgment normal.       Assessment/Plan:     Diagnosis and associated orders:   1. Viral URI  POCT GROUP A STREP, PCR    POCT CoV-2, Flu A/B, RSV by PCR         Comments/MDM:   Differential diagnosis discussed     PRESENTATION & PERTINENT Hx: Non-toxic appearance. 2 days of upper respiratory infection symptoms, did have a fever of 102 which is since broke. Using OTC medications. Pertinent history includes: HIV.     TESTING: Cepheid strep and viral panel negative,    Vital Signs: Patient is afebrile, normotensive with good oxygenation. He is tachycardic at 116    EXAM: Well-appearing, talking in full sentences, neurologically intact, no increased work of breathing, posterior OP is erythemic, uvula midline, 1+ symmetrical tonsillar enlargement, no adventitious heart or lung sounds remainder of exam is benign and w/o red flag signs or symptoms. Remainder of exam is benign and w/o red flag signs or symptoms    PLAN: Negative testing.  No red flag signs or  symptoms.  He is tachycardic but this could be secondary to feeling unwell and also not intaking enough fluids, Recommend adequate hydration, rest, deep breathing and coughing, ambulation as tolerated, OTC age appropriate medications.  Discussed signs symptoms that require immediate attention      Return to clinic or go to ED if symptoms worsen or persist. Indications for ED discussed at length. Patient/Parent/Guardian voices understanding. Follow-up with your primary care provider in 3-5 days. Red flag symptoms discussed. All side effects of medication discussed including allergic response, GI upset, tendon injury, rash, sedation etc.    I personally reviewed prior external notes and test results pertinent to today's visit as well as additional imaging and testing completed in clinic today.     Please note that this dictation was created using voice recognition software. I have made every reasonable attempt to correct obvious errors, but I expect that there are errors of grammar and possibly content that I did not discover before finalizing the note.    This note was electronically signed by CJ Perez

## 2025-01-27 ENCOUNTER — APPOINTMENT (OUTPATIENT)
Dept: MEDICAL GROUP | Facility: MEDICAL CENTER | Age: 30
End: 2025-01-27
Payer: COMMERCIAL

## 2025-01-27 VITALS
OXYGEN SATURATION: 97 % | HEIGHT: 76 IN | DIASTOLIC BLOOD PRESSURE: 74 MMHG | WEIGHT: 240 LBS | BODY MASS INDEX: 29.22 KG/M2 | HEART RATE: 83 BPM | SYSTOLIC BLOOD PRESSURE: 118 MMHG | TEMPERATURE: 97.8 F

## 2025-01-27 DIAGNOSIS — M54.31 RIGHT SCIATIC NERVE PAIN: ICD-10-CM

## 2025-01-27 DIAGNOSIS — K58.0 IRRITABLE BOWEL SYNDROME WITH DIARRHEA: ICD-10-CM

## 2025-01-27 PROCEDURE — 3074F SYST BP LT 130 MM HG: CPT | Performed by: NURSE PRACTITIONER

## 2025-01-27 PROCEDURE — 3078F DIAST BP <80 MM HG: CPT | Performed by: NURSE PRACTITIONER

## 2025-01-27 PROCEDURE — 99214 OFFICE O/P EST MOD 30 MIN: CPT | Performed by: NURSE PRACTITIONER

## 2025-01-27 RX ORDER — DOLUTEGRAVIR SODIUM AND LAMIVUDINE 50; 300 MG/1; MG/1
1 TABLET, FILM COATED ORAL DAILY
COMMUNITY
Start: 2025-01-03

## 2025-01-27 RX ORDER — DOLUTEGRAVIR SODIUM AND LAMIVUDINE 50; 300 MG/1; MG/1
1 TABLET, FILM COATED ORAL DAILY
Qty: 30 TABLET | Refills: 0
Start: 2025-01-27

## 2025-01-27 RX ORDER — MELOXICAM 15 MG/1
15 TABLET ORAL DAILY
Qty: 15 TABLET | Refills: 0 | Status: SHIPPED | OUTPATIENT
Start: 2025-01-27

## 2025-01-27 ASSESSMENT — PATIENT HEALTH QUESTIONNAIRE - PHQ9: CLINICAL INTERPRETATION OF PHQ2 SCORE: 0

## 2025-01-28 NOTE — PROGRESS NOTES
Subjective:     History of Present Illness  The patient is a 29-year-old male seen in follow-up for IBS.    He experiences frequent bowel movements, often necessitating immediate access to a restroom. His symptoms include diarrhea and daily abdominal pain, which are not localized to any specific area. He has observed that stress exacerbates his condition. He reports no presence of melena or hematochezia. He has a history of hemorrhoids, likely due to his chronic diarrhea, which were managed with an in-office procedure at a gastroenterology clinic. Despite these interventions, his primary complaints of diarrhea and abdominal pain persist. He has found some relief with psyllium husk, a fiber supplement, but it does not completely alleviate his symptoms. He is not currently under the care of a gastroenterologist and requires a referral. A previous referral to [facility] was unsuccessful as they are no longer affiliated with [facility]. He is not on Tylenol, omeprazole, or sucralfate. Stress exacerbates his condition. He has found some relief with psyllium husk, a fiber supplement, but it does not completely alleviate his symptoms. He has a history of hemorrhoids, likely due to his chronic diarrhea, which were managed with an in-office procedure at a gastroenterology clinic.    He has been experiencing what he believes to be sciatic pain for the past few weeks, which has shown no signs of improvement. The pain is most severe in his right buttock area and radiates down his leg. He is considering seeking chiropractic or physical therapy treatment. He reports no adverse reactions to ibuprofen or Aleve. He is planning a vacation to [place] next week and is concerned about the potential discomfort during the 6-hour flight.    He is on Dovato for HIV and is followed by [facility]. He goes there every 6 months and they test everything. They do all of his labs every 6 months. He had a pneumonia vaccine in 2021 and it was  Prevnar 13.    SOCIAL HISTORY  He does not smoke.    MEDICATIONS  Current: Dovato, psyllium husk    IMMUNIZATIONS  He had a pneumonia vaccine in 2021 and it was Prevnar 13.    Results  none    Current medicines (including changes today)  Current Outpatient Medications   Medication Sig Dispense Refill    DOVATO  MG Tab Take 1 Tablet by mouth every day.      dolutegravir-lamiVUDine (DOVATO)  MG Tab Take 1 Tablet by mouth every day. 30 Tablet 0    meloxicam (MOBIC) 15 MG tablet Take 1 Tablet by mouth every day. 15 Tablet 0    tizanidine (ZANAFLEX) 4 MG Tab Take 1 Tablet by mouth every 6 hours as needed (rt sciatica). 30 Tablet 3     No current facility-administered medications for this visit.     Current Outpatient Medications   Medication Sig Dispense Refill    DOVATO  MG Tab Take 1 Tablet by mouth every day.      dolutegravir-lamiVUDine (DOVATO)  MG Tab Take 1 Tablet by mouth every day. 30 Tablet 0    meloxicam (MOBIC) 15 MG tablet Take 1 Tablet by mouth every day. 15 Tablet 0    tizanidine (ZANAFLEX) 4 MG Tab Take 1 Tablet by mouth every 6 hours as needed (rt sciatica). 30 Tablet 3     No current facility-administered medications for this visit.       He  has a past medical history of Allergy, Anxiety, Deviated septum, Gastroesophageal reflux disease without esophagitis (1/30/2020), HIV (human immunodeficiency virus infection) ([facility]), and IBS (irritable bowel syndrome).      ROS   Review of Systems   Constitutional: Negative.  Negative for fever, chills, weight loss, malaise/fatigue and diaphoresis.   HENT: Negative.  Negative for hearing loss, ear pain, nosebleeds, congestion, sore throat, neck pain, tinnitus and ear discharge.    Respiratory: Negative.  Negative for cough, hemoptysis, sputum production, shortness of breath, wheezing and stridor.    Cardiovascular: Negative.  Negative for chest pain, palpitations, orthopnea, claudication, leg swelling and PND.   Gastrointestinal: denies  "nausea, vomiting, heartburn, melena or hematochezia.  Genitourinary: Denies dysuria, hematuria, urinary incontinence, frequency or urgency.    Musculoskeletal: Negative.  Negative for myalgias.   Neurological: Negative.  Negative for dizziness, tingling, tremors, weakness and headaches.   Psych:  Denies depression, anxiety or insomnia.  All other systems reviewed and are negative.       Objective:     /74   Pulse 83   Temp 36.6 °C (97.8 °F) (Temporal)   Ht 1.93 m (6' 4\")   Wt 109 kg (240 lb)   SpO2 97%  Body mass index is 29.21 kg/m².   Physical Exam  Lungs were auscultated.      Vital Signs  Blood pressure, heart rate, temperature, and respiratory rate are normal.  Physical Exam   Vitals reviewed.  Constitutional: oriented to person, place, and time. appears well-developed and well-nourished. No distress.   Neck: No JVD present.  Cardiovascular: Normal rate, regular rhythm, normal heart sounds and intact distal pulses.  Exam reveals no gallop and no friction rub.  No murmur heard.  No carotid bruits.   Pulmonary/Chest: Effort normal and breath sounds normal. No stridor. No respiratory distress. no wheezes or rales. exhibits no tenderness.   Musculoskeletal: Normal range of motion. exhibits no edema. eliza pedal pulses 2+.  Lymphadenopathy: no cervical or supraclavicular adenopathy.   Abd:  No CVAT,  Soft,  Bs noted in all quadrants.  No HSM.  No abdominal tenderness.  Bowel sounds are present and slightly faster than normal. No tenderness on palpation.  Neurological: alert and oriented to person, place, and time. exhibits normal muscle tone. Coordination normal.   Skin: Skin is warm and dry. no diaphoresis.   Psychiatric: normal mood and affect. behavior is normal.       Assessment and Plan:   The following treatment plan was discussed    Assessment & Plan  1. Irritable Bowel Syndrome (IBS).  He reports symptoms of diarrhea and stomachaches, often triggered by stress. He has been taking psyllium husk, " which has significantly helped but has not completely resolved his symptoms. A referral to gastroenterology will be initiated for further evaluation of potential bile acid malabsorption.    2. Right Sciatica.  He reports pain in the lower back radiating to the right buttock and down the leg, consistent with sciatica. A prescription for Mobic (meloxicam) 15 mg, to be taken once daily with food for 2 weeks, will be provided. Additionally, tizanidine, a muscle relaxant, will be prescribed for nighttime use to aid sleep and reduce muscle spasms. He is advised to take an extra dose of tizanidine before his upcoming flight to [place] to help with relaxation and sleep. If the current treatment regimen proves ineffective, further diagnostic measures such as x-rays and physical therapy will be considered. If physical therapy does not help, an MRI and referral to physiatry will be the next steps.    3. Health maintenance.  He is on Dovato for HIV and is followed by [facility]. He goes there every 6 months and they test everything. They do all of his labs every 6 months.       PROCEDURE  The patient had an in-office procedure at a gastroenterology clinic for hemorrhoids.      ORDERS:  1. Irritable bowel syndrome with diarrhea    - Referral to Gastroenterology    2. Right sciatic nerve pain    - meloxicam (MOBIC) 15 MG tablet; Take 1 Tablet by mouth every day.  Dispense: 15 Tablet; Refill: 0  - tizanidine (ZANAFLEX) 4 MG Tab; Take 1 Tablet by mouth every 6 hours as needed (rt sciatica).  Dispense: 30 Tablet; Refill: 3        Please note that this dictation was created using voice recognition software. I have made every reasonable attempt to correct obvious errors, but I expect that there are errors of grammar and possibly content that I did not discover before finalizing the note.      Attestation      Verbal consent was acquired by the patient to use [employer] ambient listening note generation during this visit Yes